# Patient Record
Sex: FEMALE | Race: BLACK OR AFRICAN AMERICAN | NOT HISPANIC OR LATINO | Employment: PART TIME | URBAN - METROPOLITAN AREA
[De-identification: names, ages, dates, MRNs, and addresses within clinical notes are randomized per-mention and may not be internally consistent; named-entity substitution may affect disease eponyms.]

---

## 2017-06-21 ENCOUNTER — HOSPITAL ENCOUNTER (EMERGENCY)
Facility: HOSPITAL | Age: 32
Discharge: HOME | End: 2017-06-21
Attending: EMERGENCY MEDICINE
Payer: MEDICAID

## 2017-06-21 VITALS
SYSTOLIC BLOOD PRESSURE: 184 MMHG | WEIGHT: 292 LBS | HEIGHT: 65 IN | OXYGEN SATURATION: 98 % | BODY MASS INDEX: 48.65 KG/M2 | DIASTOLIC BLOOD PRESSURE: 86 MMHG | RESPIRATION RATE: 20 BRPM | TEMPERATURE: 98 F | HEART RATE: 88 BPM

## 2017-06-21 DIAGNOSIS — R03.0 ELEVATED BLOOD PRESSURE READING: ICD-10-CM

## 2017-06-21 DIAGNOSIS — M25.562 ACUTE PAIN OF LEFT KNEE: Primary | ICD-10-CM

## 2017-06-21 LAB
B-HCG UR QL: NEGATIVE
CTP QC/QA: YES

## 2017-06-21 PROCEDURE — 99283 EMERGENCY DEPT VISIT LOW MDM: CPT | Mod: 25

## 2017-06-21 PROCEDURE — 81025 URINE PREGNANCY TEST: CPT | Performed by: EMERGENCY MEDICINE

## 2017-06-21 RX ORDER — NAPROXEN 500 MG/1
500 TABLET ORAL 2 TIMES DAILY WITH MEALS
Qty: 28 TABLET | Refills: 0 | Status: SHIPPED | OUTPATIENT
Start: 2017-06-21 | End: 2017-07-05

## 2017-06-21 NOTE — ED PROVIDER NOTES
"Encounter Date: 6/21/2017    SCRIBE #1 NOTE: I, Leslie Sierra, am scribing for, and in the presence of,  Regla Fonseca NP. I have scribed the following portions of the note - Other sections scribed: ROS and HPI.       History     Chief Complaint   Patient presents with    Leg Pain     "I was walking in the library yesterday and I tripped on something. I hurt my knee and leg (both left side)."    Knee Pain     CC: Knee Pain  HPI: This 31 y.o. female with a past medical history of Hypertension, presents to the ED complaining of a traumatic left knee pain s/p a mechanical slip and fall at a library yesterday. She states she fell forward on her L knee. Her knee pain is severe (8/10) and worse with walking. She denies any associated numbness or weakness to her L leg. She denies head trauma or LOC or any other injuries as a result of her fall. No prior medical intervention.       The history is provided by the patient. No  was used.     Review of patient's allergies indicates:  No Known Allergies  Past Medical History:   Diagnosis Date    Hypertension      Past Surgical History:   Procedure Laterality Date    HERNIA REPAIR      TUBAL LIGATION       History reviewed. No pertinent family history.  Social History   Substance Use Topics    Smoking status: Never Smoker    Smokeless tobacco: Not on file    Alcohol use Yes     Review of Systems   Constitutional: Negative for fever.   Musculoskeletal:        (+) L knee pain   Skin: Negative for rash and wound.   Neurological: Negative for syncope, weakness and numbness.       Physical Exam     Initial Vitals [06/21/17 0658]   BP Pulse Resp Temp SpO2   (!) 184/86 88 20 98 °F (36.7 °C) 98 %     Physical Exam    Constitutional: Vital signs are normal. She appears well-developed and well-nourished.  Non-toxic appearance.   Eyes: EOM are normal.   Neck: Full passive range of motion without pain. Neck supple. No no neck rigidity.   Cardiovascular: Normal " rate, S1 normal, S2 normal and normal heart sounds. Exam reveals no gallop.    No murmur heard.  Pulmonary/Chest: Effort normal and breath sounds normal. No tachypnea. She has no decreased breath sounds. She has no wheezes. She has no rhonchi. She has no rales.   Musculoskeletal:        Left knee: She exhibits normal range of motion, no swelling, no effusion, no ecchymosis, no deformity, no erythema, normal alignment and no bony tenderness. Tenderness found. Lateral joint line tenderness noted.   Gait is normal   Neurological: She is alert and oriented to person, place, and time. She has normal strength. Gait normal. GCS eye subscore is 4. GCS verbal subscore is 5. GCS motor subscore is 6.   Skin: Skin is warm and dry. No rash noted.         ED Course   Orthopedic Injury  Date/Time: 6/21/2017 9:57 AM  Authorized by: GO SARMIENTO   Performed by: ALKA URBAN  Injury location: knee  Location details: left knee  Injury type: soft tissue  Pre-procedure distal perfusion: normal  Pre-procedure neurological function: normal  Pre-procedure neurovascular assessment: neurovascularly intact  Pre-procedure range of motion: normal  Local anesthesia used: no    Anesthesia:  Local anesthesia used: no  Sedation:  Patient sedated: no    Splint type: Ace wrap.  Complications: No  Post-procedure neurovascular assessment: post-procedure neurovascularly intact  Post-procedure distal perfusion: normal  Post-procedure neurological function: normal  Post-procedure range of motion: normal  Patient tolerance: Patient tolerated the procedure well with no immediate complications        Labs Reviewed   POCT URINE PREGNANCY             Medical Decision Making:   ED Management:  This is a 31-year-old nonpregnant female who presents to the ED with complaints of left knee pain after a slip and fall that occurred yesterday.  She is afebrile and well-appearing.  Her gait is normal.  There is mild tenderness along the lateral aspect.  There  is no swelling, erythema or bony tenderness.  No indication for imaging today, the patient agrees with this.  Ace wrap given.  Advised follow-up with PCP for elevated blood pressure.  Discharged home with prescription for naproxen and instructions for supportive care and follow-up.  Return precautions given.  Patient's case was discussed with Dr. Hurtado, who agrees with her plan.            Scribe Attestation:   Scribe #1: I performed the above scribed service and the documentation accurately describes the services I performed. I attest to the accuracy of the note.    Attending Attestation:           Physician Attestation for Scribe:  Physician Attestation Statement for Scribe #1: I, Regla Fonseca NP, reviewed documentation, as scribed by Leslie Sierra in my presence, and it is both accurate and complete.                 ED Course     Clinical Impression:   The primary encounter diagnosis was Acute pain of left knee. A diagnosis of Elevated blood pressure reading was also pertinent to this visit.    Disposition:   Disposition: Discharged  Condition: Stable                        Regla Fonseca NP  06/21/17 0958

## 2017-06-21 NOTE — DISCHARGE INSTRUCTIONS
Please return to the ED for any new or worsening symptoms: chest pain, shortness of breath, loss of consciousness or any other concerns. Please follow up with primary care within in the week. You may also call [phone] for the [facility] same day appointment line.    Do not take any additional NSAIDS (ibuprofen, Aleve, etc) while taking naproxen.    Please see your primary care provider regarding high blood pressure.

## 2018-02-12 ENCOUNTER — HOSPITAL ENCOUNTER (EMERGENCY)
Facility: HOSPITAL | Age: 33
Discharge: HOME | End: 2018-02-13
Attending: EMERGENCY MEDICINE
Payer: MEDICAID

## 2018-02-12 DIAGNOSIS — R07.9 CHEST PAIN: ICD-10-CM

## 2018-02-12 DIAGNOSIS — G43.809 OTHER MIGRAINE WITHOUT STATUS MIGRAINOSUS, NOT INTRACTABLE: Primary | ICD-10-CM

## 2018-02-12 LAB
B-HCG UR QL: NEGATIVE
CTP QC/QA: YES

## 2018-02-12 PROCEDURE — 99284 EMERGENCY DEPT VISIT MOD MDM: CPT | Mod: 25

## 2018-02-12 PROCEDURE — 93010 ELECTROCARDIOGRAM REPORT: CPT | Mod: ,,, | Performed by: INTERNAL MEDICINE

## 2018-02-12 PROCEDURE — 81025 URINE PREGNANCY TEST: CPT | Performed by: EMERGENCY MEDICINE

## 2018-02-12 PROCEDURE — 93005 ELECTROCARDIOGRAM TRACING: CPT

## 2018-02-13 VITALS
SYSTOLIC BLOOD PRESSURE: 138 MMHG | OXYGEN SATURATION: 98 % | WEIGHT: 293 LBS | HEART RATE: 85 BPM | TEMPERATURE: 99 F | BODY MASS INDEX: 48.82 KG/M2 | DIASTOLIC BLOOD PRESSURE: 84 MMHG | HEIGHT: 65 IN | RESPIRATION RATE: 18 BRPM

## 2018-02-13 LAB
FLUAV AG SPEC QL IA: NEGATIVE
FLUBV AG SPEC QL IA: NEGATIVE
SPECIMEN SOURCE: NORMAL

## 2018-02-13 PROCEDURE — 87400 INFLUENZA A/B EACH AG IA: CPT | Mod: 59

## 2018-02-13 PROCEDURE — 96372 THER/PROPH/DIAG INJ SC/IM: CPT

## 2018-02-13 PROCEDURE — 63600175 PHARM REV CODE 636 W HCPCS: Performed by: EMERGENCY MEDICINE

## 2018-02-13 RX ORDER — PROCHLORPERAZINE MALEATE 5 MG
10 TABLET ORAL
Status: COMPLETED | OUTPATIENT
Start: 2018-02-13 | End: 2018-02-13

## 2018-02-13 RX ORDER — KETOROLAC TROMETHAMINE 30 MG/ML
15 INJECTION, SOLUTION INTRAMUSCULAR; INTRAVENOUS
Status: COMPLETED | OUTPATIENT
Start: 2018-02-13 | End: 2018-02-13

## 2018-02-13 RX ADMIN — KETOROLAC TROMETHAMINE 15 MG: 30 INJECTION, SOLUTION INTRAMUSCULAR at 12:02

## 2018-02-13 RX ADMIN — PROCHLORPERAZINE MALEATE 10 MG: 5 TABLET, FILM COATED ORAL at 12:02

## 2018-02-13 NOTE — DISCHARGE INSTRUCTIONS
You were seen in the emergency department for a left-sided headache.  Your exam was reassuring.  We gave you some headache medication in your symptoms improved.  We think you're safe to go home.  Please drink plenty of fluids.  Please follow-up with your primary care provider to discuss chronic headaches.  Please return for any new or worsening pain, fevers, chills, numbness, weakness, changes in vision, severe neck stiffness, or any other new or worsening concerns.

## 2018-02-13 NOTE — ED PROVIDER NOTES
Encounter Date: 2/12/2018    SCRIBE #1 NOTE: I, Art Rehman, am scribing for, and in the presence of,  Francisco Javier Ramon MD. I have scribed the following portions of the note - Other sections scribed: HPI, ROS and PE.       History     Chief Complaint   Patient presents with    Headache     started with HA on left side since earlier today with dizziness, sore throat; no vomiting but nauseated; no diarrhea; temp at home was 100.0; took eli seltzer earlier today and took nap but when woke up it was worse;      CC: Headache    HPI: This 32 y.o F with HTN presents to the ED c/o acute onset of a gradually worsening and severe (8/10) left side headache with associated nausea which began at 1330 while driving. The pt also reports left eye blurry vision. The pt's headache is similar to her previous headaches, but is more severe. The pt attempted tx with Eli seltzer and aleve at 2200 with slight improvement. The pt's headache is worse when laying on the left side. The pt also reports sore throat and bilateral ankle swelling. The pt denies emesis, neck pain, cough, rhinorrhea, chest pain, abdominal pain, back pain and neck stiffness.      The history is provided by the patient. No  was used.     Review of patient's allergies indicates:  No Known Allergies  Past Medical History:   Diagnosis Date    Hypertension      Past Surgical History:   Procedure Laterality Date    HERNIA REPAIR      TUBAL LIGATION       History reviewed. No pertinent family history.  Social History   Substance Use Topics    Smoking status: Never Smoker    Smokeless tobacco: Not on file    Alcohol use Yes     Review of Systems   Constitutional: Negative for chills, diaphoresis and fever.   HENT: Positive for sore throat. Negative for rhinorrhea.    Eyes: Positive for visual disturbance (L eye blurry vision). Negative for redness.   Respiratory: Negative for cough and shortness of breath.    Cardiovascular: Negative for chest  pain.   Gastrointestinal: Positive for nausea. Negative for abdominal pain, diarrhea and vomiting.   Genitourinary: Negative for difficulty urinating, dysuria, frequency and urgency.   Musculoskeletal: Negative for back pain, neck pain and neck stiffness.        (+) bilateral ankle swelling   Skin: Negative for rash.   Neurological: Positive for headaches (L side).   Psychiatric/Behavioral: Negative for confusion. The patient is not nervous/anxious.        Physical Exam     Initial Vitals [02/12/18 2317]   BP Pulse Resp Temp SpO2   (!) 144/90 87 20 99.5 °F (37.5 °C) 97 %      MAP       108         Physical Exam    Nursing note and vitals reviewed.  Constitutional: She appears well-developed and well-nourished. She is not diaphoretic.  Non-toxic appearance. She does not appear ill. No distress.   HENT:   Head: Normocephalic and atraumatic.   Nose: Nose normal.   Eyes: EOM are normal. Pupils are equal, round, and reactive to light. No scleral icterus.   Neck: Normal range of motion. Neck supple.   Cardiovascular: Normal rate, regular rhythm, normal heart sounds and intact distal pulses. Exam reveals no gallop and no friction rub.    No murmur heard.  Pulmonary/Chest: Effort normal and breath sounds normal. No stridor. No respiratory distress. She has no wheezes. She has no rhonchi. She has no rales.   Abdominal: Soft. Normal appearance and bowel sounds are normal. She exhibits no distension. There is no tenderness. There is no rebound and no guarding.   Musculoskeletal: Normal range of motion. She exhibits no edema or tenderness.   Neurological: She is alert and oriented to person, place, and time. No cranial nerve deficit.   Normal finger to nose, heel to shin, normal gait, normal speech and phonation, no pronator drift.     Skin: Skin is warm and dry. No rash noted.   Psychiatric: She has a normal mood and affect. Her behavior is normal.         ED Course   Procedures  Labs Reviewed   INFLUENZA A AND B ANTIGEN    POCT URINE PREGNANCY             Medical Decision Making:   Initial Assessment:   32-year-old female otherwise healthy with history of migraine headaches presenting with unilateral headache, left sided, gradual onset with mild nausea no vomiting.  Denies neck pain or stiffness.  States headache similar to prior headaches just slightly worse than usual. Denies trauma, fevers, chills. Notes recent URI with slight sore throat. Otherwise in usual state of health.  Denies numbness or weakness. Thorough neuro exam negative. Patient given toradol and compazine with symptomatic relief. Low concern for meningitis, no meningismus on exam, photophobia, fever. Well appearing patient. [place] subarachnoid hemorrhage rule negative for SAH. Patient feeling much better on toradol and compazine. Okay for DC home, strict return precautions and PCP f/u advised.             Scribe Attestation:   Scribe #1: I performed the above scribed service and the documentation accurately describes the services I performed. I attest to the accuracy of the note.    Attending Attestation:           Physician Attestation for Scribe:  Physician Attestation Statement for Scribe #1: I, Francisco Javier Ramon MD, reviewed documentation, as scribed by Art Rehman in my presence, and it is both accurate and complete.                    Clinical Impression:   The primary encounter diagnosis was Other migraine without status migrainosus, not intractable. A diagnosis of Chest pain was also pertinent to this visit.    Disposition:   Disposition: Discharged  Condition: Stable                        Francisco Javier Ramon MD  02/22/18 0118

## 2018-06-13 ENCOUNTER — HOSPITAL ENCOUNTER (EMERGENCY)
Facility: HOSPITAL | Age: 33
Discharge: HOME | End: 2018-06-13
Attending: EMERGENCY MEDICINE
Payer: MEDICAID

## 2018-06-13 VITALS
OXYGEN SATURATION: 100 % | BODY MASS INDEX: 48.82 KG/M2 | TEMPERATURE: 99 F | RESPIRATION RATE: 16 BRPM | HEART RATE: 76 BPM | WEIGHT: 293 LBS | DIASTOLIC BLOOD PRESSURE: 82 MMHG | HEIGHT: 65 IN | SYSTOLIC BLOOD PRESSURE: 164 MMHG

## 2018-06-13 DIAGNOSIS — M79.605 LEFT LEG PAIN: Primary | ICD-10-CM

## 2018-06-13 LAB
B-HCG UR QL: NEGATIVE
CTP QC/QA: YES

## 2018-06-13 PROCEDURE — 81025 URINE PREGNANCY TEST: CPT | Performed by: NURSE PRACTITIONER

## 2018-06-13 PROCEDURE — 63600175 PHARM REV CODE 636 W HCPCS: Performed by: NURSE PRACTITIONER

## 2018-06-13 PROCEDURE — 96372 THER/PROPH/DIAG INJ SC/IM: CPT

## 2018-06-13 PROCEDURE — 99284 EMERGENCY DEPT VISIT MOD MDM: CPT | Mod: 25

## 2018-06-13 RX ORDER — IBUPROFEN 600 MG/1
600 TABLET ORAL EVERY 6 HOURS PRN
Qty: 20 TABLET | Refills: 0 | OUTPATIENT
Start: 2018-06-13 | End: 2022-05-28

## 2018-06-13 RX ORDER — BETAMETHASONE SODIUM PHOSPHATE AND BETAMETHASONE ACETATE 3; 3 MG/ML; MG/ML
12 INJECTION, SUSPENSION INTRA-ARTICULAR; INTRALESIONAL; INTRAMUSCULAR; SOFT TISSUE
Status: COMPLETED | OUTPATIENT
Start: 2018-06-13 | End: 2018-06-13

## 2018-06-13 RX ADMIN — BETAMETHASONE SODIUM PHOSPHATE AND BETAMETHASONE ACETATE 12 MG: 3; 3 INJECTION, SUSPENSION INTRA-ARTICULAR; INTRALESIONAL; INTRAMUSCULAR at 10:06

## 2018-06-13 NOTE — ED TRIAGE NOTES
Patient presents to ED alert and oriented, via private transportation.patient reports left ankle pain x one month.

## 2018-06-13 NOTE — ED PROVIDER NOTES
"Encounter Date: 6/13/2018       History     Chief Complaint   Patient presents with    Ankle Pain     "Outside left ankle pain for a while and it goes up to right below the knee."  No medications for it      32-year-old obese female with no past medical history presenting for evaluation of left lower extremity pain has been ongoing for several months.  Pain is atraumatic.  She reports pain to the left calf, left thigh, and left hip.  She describes the pain as tingling and numb.  Pain is exacerbated by ambulation and weight-bearing.  She is unsure whether or not the pain radiates from her hip to her ankle.  She denies lumbar pain, saddle anesthesia, bowel/bladder incontinence, fever, weakness in the lower extremities, or any additional symptoms.  She has attempted treatment with Tylenol without relief of symptoms.          Review of patient's allergies indicates:  No Known Allergies  Past Medical History:   Diagnosis Date    Hypertension      Past Surgical History:   Procedure Laterality Date    HERNIA REPAIR      TUBAL LIGATION       History reviewed. No pertinent family history.  Social History   Substance Use Topics    Smoking status: Never Smoker    Smokeless tobacco: Not on file    Alcohol use Yes      Comment: ocassionally     Review of Systems   Constitutional: Negative for chills, fatigue and fever.   HENT: Negative for congestion, ear pain, nosebleeds, postnasal drip, rhinorrhea, sinus pressure and sore throat.    Eyes: Negative for photophobia and pain.   Respiratory: Negative for apnea, cough, choking, chest tightness, shortness of breath, wheezing and stridor.    Cardiovascular: Negative for chest pain, palpitations and leg swelling.   Gastrointestinal: Negative for abdominal pain, constipation, diarrhea, nausea and vomiting.   Genitourinary: Negative for dysuria, flank pain, hematuria, pelvic pain and vaginal discharge.   Musculoskeletal: Negative for arthralgias, back pain, gait problem and neck " pain.        Left lower extremity pain   Skin: Negative for color change, pallor, rash and wound.   Neurological: Negative for dizziness, seizures, syncope, facial asymmetry, light-headedness and headaches.   Hematological: Negative for adenopathy.   Psychiatric/Behavioral: Negative for confusion. The patient is not nervous/anxious.        Physical Exam     Initial Vitals [06/13/18 0658]   BP Pulse Resp Temp SpO2   (!) 144/90 85 16 99.2 °F (37.3 °C) 99 %      MAP       --         Physical Exam    Nursing note and vitals reviewed.  Constitutional: She appears well-developed and well-nourished. She is not diaphoretic. She is Obese . No distress.   HENT:   Head: Normocephalic and atraumatic.   Right Ear: External ear normal.   Left Ear: External ear normal.   Nose: Nose normal.   Eyes: Conjunctivae and EOM are normal. Pupils are equal, round, and reactive to light. Right eye exhibits no discharge. Left eye exhibits no discharge. No scleral icterus.   Neck: Normal range of motion. Neck supple. No thyromegaly present. No tracheal deviation present. No JVD present.   Cardiovascular: Normal rate, regular rhythm, normal heart sounds and intact distal pulses. Exam reveals no gallop and no friction rub.    No murmur heard.  Pulmonary/Chest: Breath sounds normal. No stridor. No respiratory distress. She has no wheezes. She has no rhonchi. She has no rales.   Abdominal: Soft. Bowel sounds are normal. She exhibits no distension and no mass. There is no tenderness. There is no rebound and no guarding.   Musculoskeletal: Normal range of motion. She exhibits no edema or tenderness.   Pain and mild tenderness to palpation to left thigh and left calf.  No swelling, erythema, bruising, warmth, or any other abnormalities.  DP pulses are equal and normal bilaterally. Capillary refill in the a left foot is normal. Compartments are soft.   Lymphadenopathy:     She has no cervical adenopathy.   Neurological: She is alert and oriented to  person, place, and time. She has normal strength and normal reflexes. She displays normal reflexes. No cranial nerve deficit or sensory deficit.   Skin: Skin is warm and dry. No rash and no abscess noted. No erythema. No pallor.   Psychiatric: She has a normal mood and affect. Her behavior is normal. Judgment and thought content normal.         ED Course   Procedures  Labs Reviewed   POCT URINE PREGNANCY          US Lower Extremity Veins Left   Final Result      No evidence of deep venous thrombosis in the left lower extremity.         Electronically signed by: Rachid Sneed MD   Date:    06/13/2018   Time:    09:27           Medical Decision Making:   Differential Diagnosis:   Includes DVT, lumbar radiculopathy, fracture, dislocation, compartment syndrome, others  Clinical Tests:   Radiological Study: Reviewed and Ordered  ED Management:  32-year-old female presenting for a traumatic left lower extremity pain that has been ongoing for several months.  Pain is exacerbated by weight-bearing and ambulation.  Pain is described as a tingling and a numbness.  The pain is the worst in the calf but also includes the thigh.  The patient cannot verbalize whether not the pain radiates from her hip to her foot.  She denies lumbar pain, abdominal pain, incontinence, nausea, vomiting, fever, or any additional symptoms. She is afebrile, well-appearing, and in no distress.  Vitals are stable. There is mild tenderness to the left calf and left thigh.  There is no swelling, erythema, warmth, bruising, or any additional abnormalities.  DP pulses are normal bilaterally.  Compartments are soft.  Ultrasound of the left lower extremity shows no acute abnormalities.  Specifically no evidence of DVT.  Uncertain etiology of patient's symptoms. Clinically symptoms are consistent with lumbar radiculopathy.  Treated with IM corticosteroids and prescribed ibuprofen at discharge. Advised patient to follow up with her PCP for re-evaluation and  further management.  ED return precautions given.  Patient expressed understanding of diagnosis, discharge instructions, return precautions.  Other:   I have discussed this case with another health care provider.       <> Summary of the Discussion: Case discussed with my attending physician who agreed with the assessment and plan.                      Clinical Impression:   The encounter diagnosis was Left leg pain.      Disposition:   Disposition: Discharged  Condition: Stable                        Michael Almazan NP  06/13/18 1012

## 2018-06-13 NOTE — DISCHARGE INSTRUCTIONS
Take ibuprofen as needed and only as prescribed for pain.    Follow-up with your primary physician for re-evaluation and further management.    Return to the emergency department for any new or worsening symptoms or as needed.

## 2020-10-19 ENCOUNTER — HOSPITAL ENCOUNTER (EMERGENCY)
Facility: HOSPITAL | Age: 35
Discharge: HOME | End: 2020-10-19
Attending: EMERGENCY MEDICINE
Payer: MEDICAID

## 2020-10-19 VITALS
OXYGEN SATURATION: 100 % | DIASTOLIC BLOOD PRESSURE: 85 MMHG | HEART RATE: 81 BPM | BODY MASS INDEX: 48.82 KG/M2 | HEIGHT: 65 IN | SYSTOLIC BLOOD PRESSURE: 146 MMHG | TEMPERATURE: 98 F | WEIGHT: 293 LBS | RESPIRATION RATE: 18 BRPM

## 2020-10-19 DIAGNOSIS — M54.42 ACUTE LEFT-SIDED LOW BACK PAIN WITH LEFT-SIDED SCIATICA: Primary | ICD-10-CM

## 2020-10-19 DIAGNOSIS — M54.12 CERVICAL RADICULOPATHY: ICD-10-CM

## 2020-10-19 LAB
B-HCG UR QL: NEGATIVE
CTP QC/QA: YES

## 2020-10-19 PROCEDURE — 96372 THER/PROPH/DIAG INJ SC/IM: CPT

## 2020-10-19 PROCEDURE — 63600175 PHARM REV CODE 636 W HCPCS: Performed by: EMERGENCY MEDICINE

## 2020-10-19 PROCEDURE — 99284 EMERGENCY DEPT VISIT MOD MDM: CPT | Mod: 25

## 2020-10-19 RX ORDER — KETOROLAC TROMETHAMINE 30 MG/ML
30 INJECTION, SOLUTION INTRAMUSCULAR; INTRAVENOUS
Status: COMPLETED | OUTPATIENT
Start: 2020-10-19 | End: 2020-10-19

## 2020-10-19 RX ORDER — DEXAMETHASONE SODIUM PHOSPHATE 4 MG/ML
4 INJECTION, SOLUTION INTRA-ARTICULAR; INTRALESIONAL; INTRAMUSCULAR; INTRAVENOUS; SOFT TISSUE
Status: COMPLETED | OUTPATIENT
Start: 2020-10-19 | End: 2020-10-19

## 2020-10-19 RX ORDER — LOSARTAN POTASSIUM 50 MG/1
50 TABLET ORAL DAILY
COMMUNITY

## 2020-10-19 RX ORDER — DIAZEPAM 5 MG/1
5 TABLET ORAL EVERY 8 HOURS PRN
Qty: 10 TABLET | Refills: 0 | Status: SHIPPED | OUTPATIENT
Start: 2020-10-19 | End: 2020-11-18

## 2020-10-19 RX ORDER — IBUPROFEN 600 MG/1
600 TABLET ORAL EVERY 8 HOURS PRN
Qty: 15 TABLET | Refills: 0 | OUTPATIENT
Start: 2020-10-19 | End: 2022-05-28

## 2020-10-19 RX ADMIN — DEXAMETHASONE SODIUM PHOSPHATE 4 MG: 4 INJECTION, SOLUTION INTRA-ARTICULAR; INTRALESIONAL; INTRAMUSCULAR; INTRAVENOUS; SOFT TISSUE at 08:10

## 2020-10-19 RX ADMIN — KETOROLAC TROMETHAMINE 30 MG: 30 INJECTION, SOLUTION INTRAMUSCULAR; INTRAVENOUS at 08:10

## 2020-10-19 NOTE — Clinical Note
"Anamaria"Malcolm Connell was seen and treated in our emergency department on 10/19/2020.  She may return to work on 10/21/2020.       If you have any questions or concerns, please don't hesitate to call.      Ethan Raman MD"

## 2020-10-20 NOTE — ED TRIAGE NOTES
Pt arrives to the ED reports left sided hip pain that radiates to her left leg and has some swelling. Reports hx of sciatic pain, pt states she took some OTC pain meds with no relief.

## 2020-10-20 NOTE — ED PROVIDER NOTES
Encounter Date: 10/19/2020       History     Chief Complaint   Patient presents with    Generalized Body Aches     Pt with hx of sciatica, began with pain about 0800 that has increased throughout the day. Pt c/o L leg pain into the hip.     Leg Swelling     Ms. Connell reports left-sided low back pain radiating to her left buttock, left hip down the lateral aspect of the left lower leg radiating around the left side of the leg to the top of the left foot, all has been present for at least 6 months.  She reports this is a flare of pain.  She reports home meloxicam is only minimally working.  She has been seen by PCP nurse practitioner and has been told she will need therapy but has not yet been referred for therapy.  She also reports similar type symptoms in the left side of the neck radiating down the left lateral upper arm for about 2-3 months, intermittently, and they do not always happen at the same time.  She denies any trauma.  She denies any weakness in any extremities.  She denies any changes or problems with her bladder or bowels.     The history is provided by the patient.     Review of patient's allergies indicates:  No Known Allergies  Past Medical History:   Diagnosis Date    Hypertension      Past Surgical History:   Procedure Laterality Date    HERNIA REPAIR      TUBAL LIGATION       History reviewed. No pertinent family history.  Social History     Tobacco Use    Smoking status: Never Smoker   Substance Use Topics    Alcohol use: Yes     Comment: ocassionally    Drug use: No     Review of Systems   Gastrointestinal: Negative.    Musculoskeletal:        No current neck or arm symptoms.   L lower back pain radiating to L hip and down lateral L leg. No weakness   All other systems reviewed and are negative.      Physical Exam     Initial Vitals [10/19/20 1705]   BP Pulse Resp Temp SpO2   (!) 179/89 84 18 98.9 °F (37.2 °C) 100 %      MAP       --         Physical Exam    Nursing note and vitals  reviewed.  Constitutional: She appears well-developed and well-nourished. She is not diaphoretic.   HENT:   Head: Normocephalic and atraumatic.   Eyes: Conjunctivae are normal.   Cardiovascular: Normal rate and regular rhythm.   No murmur heard.  Pulmonary/Chest: Breath sounds normal. No respiratory distress.   Musculoskeletal: Normal range of motion. No edema.      Comments: 1+ patellar reflexes, difficult due to habitus. Symmetric, good perfusion   Neurological: She is alert and oriented to person, place, and time. She has normal strength. GCS score is 15. GCS eye subscore is 4. GCS verbal subscore is 5. GCS motor subscore is 6.   Normal gait. Able to fully bend at waist and almost touch toes, positive straight leg raise at almost fully hip flexion. Full rom of neck. 5/5 strength in all ext         ED Course   Procedures  Labs Reviewed - No data to display       Imaging Results          X-Ray Cervical Spine AP And Lateral (Final result)  Result time 10/19/20 20:10:01    Final result by Rajeev Rocha MD (10/19/20 20:10:01)                 Impression:      1. No convincing acute displaced fracture or dislocation of the cervical spine as visualized.      Electronically signed by: Rajeev Rocha MD  Date:    10/19/2020  Time:    20:10             Narrative:    EXAMINATION:  XR CERVICAL SPINE AP LATERAL    CLINICAL HISTORY:  Radiculopathy, cervical region    TECHNIQUE:  AP, lateral and open mouth views of the cervical spine were performed.    COMPARISON:  None.    FINDINGS:  Please note, C7 is obscured by soft tissues on lateral view.    Allowing for the above, lateral imaging demonstrates adequate alignment of the cervical spine without significant vertebral body height loss or disc space height loss.  The facet joints are aligned.  AP spinal alignment is unremarkable.  The visualized lung apices are grossly clear.  There may be cardiomegaly versus magnification by technique.  The odontoid is intact.  The  lateral masses of C1 are in anatomic relationship with C2.  The paraspinous and hypopharyngeal soft tissues are unremarkable.                               X-Ray Lumbar Spine Ap And Lateral (Final result)  Result time 10/19/20 20:10:59    Final result by Rajeev Rocha MD (10/19/20 20:10:59)                 Impression:      1. No acute displaced fracture or dislocation of the lumbar spine.      Electronically signed by: Rajeev Rocha MD  Date:    10/19/2020  Time:    20:10             Narrative:    EXAMINATION:  XR LUMBAR SPINE AP AND LATERAL    CLINICAL HISTORY:  Back pain or radiculopathy, > 6 wks;    TECHNIQUE:  AP, lateral and spot images were performed of the lumbar spine.    COMPARISON:  None    FINDINGS:  Three views lumbar spine.    Lateral imaging demonstrates adequate alignment of the lumbar spine without significant vertebral body height loss or disc space height loss.  The facet joints are aligned.  AP spinal alignment is unremarkable noting patient is tilted to the left..  The sacral segments are aligned.  The bilateral sacroiliac joints are intact.                                 Medical Decision Making:   Clinical Tests:   Radiological Study: Ordered and Reviewed  ED Management:  Ms Connell has been stable during her time in the ER. Feels some better. Has a normal steady gait. Has had this pain for many months and has a f/u plan. No acute worsening or dangerous clinical concerns at this time.   We discussed home care and return precautions and she is happy with the plan. There is no indication for further emergent intervention or evaluation at this time.                                Clinical Impression:       ICD-10-CM ICD-9-CM   1. Acute left-sided low back pain with left-sided sciatica  M54.42 724.2     724.3   2. Cervical radiculopathy  M54.12 723.4                          ED Disposition Condition    Discharge Stable        ED Prescriptions     Medication Sig Dispense Start Date End Date  Auth. Provider    diazePAM (VALIUM) 5 MG tablet Take 1 tablet (5 mg total) by mouth every 8 (eight) hours as needed (muscle spasm). 10 tablet 10/19/2020 11/18/2020 Ethan Raman MD    ibuprofen (ADVIL,MOTRIN) 600 MG tablet Take 1 tablet (600 mg total) by mouth every 8 (eight) hours as needed for Pain. 15 tablet 10/19/2020  Ethan Raman MD        Follow-up Information     Follow up With Specialties Details Why Contact Info    [facility]  As needed, If symptoms worsen [address] [facility] [address] [phone]    Carla Ellis MD Orthopedic Surgery Schedule an appointment as soon as possible for a visit   [address] [facility] [address]  [phone]                                         Ethan Raman MD  10/20/20 7753

## 2020-11-12 ENCOUNTER — CLINICAL SUPPORT (OUTPATIENT)
Dept: REHABILITATION | Facility: HOSPITAL | Age: 35
End: 2020-11-12
Payer: MEDICAID

## 2020-11-12 DIAGNOSIS — M54.50 CHRONIC LEFT-SIDED LOW BACK PAIN, UNSPECIFIED WHETHER SCIATICA PRESENT: ICD-10-CM

## 2020-11-12 DIAGNOSIS — G89.29 CHRONIC LEFT-SIDED LOW BACK PAIN, UNSPECIFIED WHETHER SCIATICA PRESENT: ICD-10-CM

## 2020-11-12 DIAGNOSIS — R29.898 WEAKNESS OF LOWER EXTREMITY, UNSPECIFIED LATERALITY: ICD-10-CM

## 2020-11-12 PROCEDURE — 97162 PT EVAL MOD COMPLEX 30 MIN: CPT | Mod: PN

## 2020-11-12 NOTE — PROGRESS NOTES
[facility]  Physical Therapy Initial Evaluation    Date: 11/12/2020   Name: Anamaria Connell  Clinic Number: [id number]    Therapy Diagnosis:   Encounter Diagnoses   Name Primary?    Chronic left-sided low back pain, unspecified whether sciatica present     Weakness of lower extremity, unspecified laterality      Physician: Clementina Sharma NP    Physician Orders: PT Eval and Treat   Medical Diagnosis from Referral:M54.30 (ICD-10-CM) - Sciatic nerve pain     Evaluation Date: 11/12/2020  Authorization Period Expiration: 12/12/20  Plan of Care Expiration: 2/4/21  Visit # / Visits authorized: 1/ 1    Time In: 3:00 pm  Time Out: 3:38 pm  Total Appointment Time (timed & untimed codes): 38 minutes    Precautions: Standard and HTN    Subjective   Date of onset: August 2020  History of current condition - Anamaria reports: L sided LBP with L radicular symptoms x 1 year that have worsened over the last several months. No specific JOJO caused her pain. She also have been experiencing L ankle swelling. She states her pain is hard to describe but gets some numbness in her L ankle. Went to therapy in the past for her  Knee and ankle without improvement but has not attended for her back. She went to the ER for her back pain but has been unable to find an Orthopedic that takes her insurance. Denies bowel/bladder changes.     Medical History:   Past Medical History:   Diagnosis Date    Hypertension        Surgical History:   Anamaria Connell  has a past surgical history that includes Tubal ligation and Hernia repair.    Medications:   Anamaria has a current medication list which includes the following prescription(s): diazepam, ibuprofen, ibuprofen, lisinopril-hydrochlorothiazide, and losartan.    Allergies:   Review of patient's allergies indicates:  No Known Allergies     Imaging, xray lumbar spine  : Impression:     1. No acute displaced fracture or dislocation of the lumbar spine.      Prior Therapy:  none  Occupation: Nursing assistant  Prior Level of Function: independent, non painful walking  Current Level of Function:  ADL's and work activities limited by pain, unable to walk 1/2 mile due to pain    Pain:  Current 8/10, worst 10/10, best 4/10   Location: left lumbar spine  Description: Aching and Throbbing and numb  Aggravating Factors: bending, lifting, walking  Easing Factors: meloxicam and ib profen    Patients goals: reduce her pain    Objective       Observation: Ambulates without AD and non antalgic gait pattern.    Posture:  Decreased lumbar lordosis, B knee valgus, B femoral anteversion, B pes planus    Lumbar Range of Motion:    Degrees Pain   Flexion Touches toes, flattening of lumbar curve       Extension 12      Left Side Bending 20    Right Side Bending 20    Left rotation   50% Mild L sided LBP      Right Rotation   50%  Mild L sided LBP             Lower Extremity Strength  Right LE  Left LE    Knee extension: 4/5 Knee extension: 4/5   Knee flexion: 5/5 Knee flexion: 5/5   Hip flexion: 4/5 Hip flexion: 4/5   Hip extension:  3+/5 Hip extension: 3+/5   Hip abduction: 3+/5 Hip abduction: 3+/5   Ankle dorsiflexion: 5/5 Ankle dorsiflexion: 5/5         Special Tests:  -Repeated Flexion: negative  -Repeated Ext: negative  -SLR test: negative       DTR:   Right Left Comment   Patellar (L3-4) 2+ 2+        Joint Mobility: unable to assess secondary to soft tissue mass    Palpation: negative TTP but patient points to L quadratus lumborum as painful area    Sensation: SILT B LE    Flexibility: Mild decreased flexibility L hamstring        Limitation/Restriction for FOTO Lumbar Survey    Therapist reviewed FOTO scores for Anamaria Connell on 11/12/2020.   FOTO documents entered into [employer] - see Media section.    Limitation Score: 30%         TREATMENT   Treatment Time In: 3:30 pm  Treatment Time Out: 3:38 pm  Total Treatment time (time-based codes) separate from Evaluation: 8 minutes    Anamaria received  therapeutic exercises to develop strength and core stabilization for 8 minutes including:  LTR 2 x 10  PPT with TA brace 1 x 10  Bridges 1 x 10   Seated sciatic nerve glides on L 1 x 10       Anamaria received the following manual therapy techniques:  0 minutes, including:      Anamaria received hot pack for 0 minutes.    Anamaria received cold pack for 0 minutes.    Home Exercises and Patient Education Provided    Education provided:   - role of PT, plan of care, involved anatomy and pathology,  goals, rational for treatment and exercise, scheduling limitations      Written Home Exercises Provided: Patient instructed to cont prior HEP.  Exercises were reviewed and Anamaria was able to demonstrate them prior to the end of the session.  Anamaria demonstrated good  understanding of the education provided.     See EMR under Patient Instructions for exercises provided 11/12/2020.    Assessment   Anamaria is a 35 y.o. female referred to outpatient Physical Therapy with a medical diagnosis of M54.30 (ICD-10-CM) - Sciatic nerve pain. She reports chronic LBP x 1 year that has worsened over the last 3 months from no specific JOJO. Patient presents with mild decreased in lumbar AROM and B hip weakness. Demonstrates negative lumbar special tests, no directional preference, and negative TTP L lumbar spine. Unable to elicit her reports of L sided radicular symptoms during evaluation.Pt would benefit from skilled PT services in order to address listed deficits, improve tolerance to activities, establish HEP, and decrease pain to maximize quality of life. Pt is motivated to participate in therapy and is in agreement with POC.    Pt may be appropriate for healthy back program to improve back/hip strength and increase tolerance to work activities.     Patient prognosis is Good.   Patientt will benefit from skilled outpatient Physical Therapy to address the deficits stated above and in the chart below, provide patient /family education, and to  maximize patientt's level of independence.     Plan of care discussed with patient: Yes  Patient's spiritual, cultural and educational needs considered and patient is agreeable to the plan of care and goals as stated below:     Anticipated Barriers for therapy: chronicity of symptoms, high BMI    Medical Necessity is demonstrated by the following  History  Co-morbidities and personal factors that may impact the plan of care Co-morbidities:   HTN and chronicity of symptoms, high BMI    Personal Factors:   no deficits     moderate   Examination  Body Structures and Functions, activity limitations and participation restrictions that may impact the plan of care Body Regions:   back  lower extremities    Body Systems:    gross symmetry  ROM  strength  gross coordinated movement  balance  gait  transfers  transitions    Participation Restrictions:   Walking, working    Activity limitations:   Learning and applying knowledge  no deficits    General Tasks and Commands  no deficits    Communication  no deficits    Mobility  walking    Self care  no deficits    Domestic Life  doing house work (cleaning house, washing dishes, laundry)    Interactions/Relationships  no deficits    Life Areas  no deficits    Community and Social Life  community life  recreation and leisure         high   Clinical Presentation evolving clinical presentation with changing clinical characteristics moderate   Decision Making/ Complexity Score: moderate     Short Term Goals (4 Weeks):     1. Pt to increase strength of hip abduction and hip extension muscles to  >/= 4-/5  to allow for improvement in tolerance to lifting/bending during heavy household chores.  2. Pt to demonstrate understanding of pathology and use of HEP for improved self-management of symptoms.   3. Pt to report decreased pain to 7/10 current pain for increased participation in social/community activities.   4. Pt to increased FOTO performance to </= 25% limitation for improved  participation.   5. The patient will participate in healthy back evaluation.       Long Term Goals (8 Weeks):     1. Pt to increase strength of hip abduction and hip extension muscles to  >/= 4/5  to allow for improvement in tolerance to lifting/bending during heavy household chores.  2. Pt to demonstrate proficiency with HEP for improved independence with self-management of condition/symptoms and prevention of re-injury.   3. Pt to report decreased pain to </= 9/10 at worst for increased participation in social/community activities.   4. Pt to increased FOTO performance to </= 20 % limitation for improved participation.   5. The patient will report tolerating ambulation 1/2 mile with pain </= 5/10 to allow for return to walking for exercise.                 Plan   Plan of care Certification: 11/12/2020 to 2/4/21.    Outpatient Physical Therapy 2 times weekly for 8 weeks to include the following interventions: Manual Therapy, Moist Heat/ Ice, Neuromuscular Re-ed, Patient Education, Self Care, Therapeutic Activites and Therapeutic Exercise.     ILIA SMALLS, PT     I CERTIFY THE NEED FOR THESE SERVICES FURNISHED UNDER THIS PLAN OF TREATMENT AND WHILE UNDER MY CARE    Physician's comments:        Physician's Signature: ___________________________________________________

## 2020-11-13 PROBLEM — R29.898 LOWER EXTREMITY WEAKNESS: Status: ACTIVE | Noted: 2020-11-13

## 2020-11-13 PROBLEM — G89.29 CHRONIC BACK PAIN: Status: ACTIVE | Noted: 2020-11-13

## 2020-11-13 PROBLEM — M54.9 CHRONIC BACK PAIN: Status: ACTIVE | Noted: 2020-11-13

## 2020-12-01 ENCOUNTER — CLINICAL SUPPORT (OUTPATIENT)
Dept: REHABILITATION | Facility: HOSPITAL | Age: 35
End: 2020-12-01
Payer: MEDICAID

## 2020-12-01 DIAGNOSIS — R29.898 WEAKNESS OF LOWER EXTREMITY, UNSPECIFIED LATERALITY: ICD-10-CM

## 2020-12-01 DIAGNOSIS — M54.50 CHRONIC LEFT-SIDED LOW BACK PAIN, UNSPECIFIED WHETHER SCIATICA PRESENT: Primary | ICD-10-CM

## 2020-12-01 DIAGNOSIS — G89.29 CHRONIC LEFT-SIDED LOW BACK PAIN, UNSPECIFIED WHETHER SCIATICA PRESENT: Primary | ICD-10-CM

## 2020-12-01 PROCEDURE — 97110 THERAPEUTIC EXERCISES: CPT | Mod: PN

## 2020-12-01 NOTE — PROGRESS NOTES
[facility]      Name: Anamaria Connell  Clinic Number: [id number]    Therapy Diagnosis:   Encounter Diagnoses   Name Primary?    Chronic left-sided low back pain, unspecified whether sciatica present Yes    Weakness of lower extremity, unspecified laterality      Physician: Clementina Sharma NP    Visit Date: 2020    Physician Orders: PT Eval and Treat   Medical Diagnosis from Referral:M54.30 (ICD-10-CM) - Sciatic nerve pain      Evaluation Date: 2020  Authorization Period Expiration: 20  Plan of Care Expiration: 21  Visit # / Visits authorized:      Time In: 3:45 pm  Time Out: 4:40 pm  Total Appointment Time (timed & untimed codes): 55 minutes     Precautions: Standard and HTN    Pattern determined: 2    Subjective   Anamaria reports some slight improvements in symptoms since evaluation but they continue to fluctuate with time spent sitting at work..      Patient reports tolerating previous visit well with no residual issues or symptoms  Patient reports their pain to be 3/10 on a 0-10 scale with 0 being no pain and 10 being the worst pain imaginable.  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, walking  Pt goals: To be able to work without pain    Objective     Baseline IM Testing Results:   Date of testin2020  ROM 3-30 deg   Max Peak Torque 153    Min Peak Torque 51    Flex/Ext Ratio 3:1   % below normative data 45     Outcomes: FOTO  Initial score:  Visit 5 score:  Goal:      Treatment    Pt was instructed in and performed the following:     Anamaria received therapeutic exercises to develop/improved posture, cardiovascular endurance, muscular endurance, lumbar/cervical ROM, strength and muscular endurance for 50 minutes including the following exercises:     Initial lumbar MedX testing    [facility] 2020   Visit Number 1   VAS Pain Rating 3   Time 10   Flexion in Lying 10   Lumbar Extension Seat Pad 0   Femur  Restraint 6   Top Dead Center 24   Counterweight 219   Lumbar Flexion 30   Lumbar Extension 3   Lumbar Peak Torque 153   Min Torque 51   Test Percent Below Normative Data 45     Supine PPT  LTR x 2'  DKTC x 15      Peripheral muscle strengthening which included 1 set of 15-20 repetitions at a slow, controlled 10-13 second per rep pace focused on strengthening supporting musculature for improved body mechanics and functional mobility.  Pt and therapist focused on proper form during treatment to ensure optimal strengthening of each targeted muscle group.  Machines were utilized including torso rotation, leg extension, leg curl, chest press, upright row. Tricep extension, bicep curl, leg press, and hip abduction added visit 3    Anamaria received the following manual therapy techniques: nil.         Home Exercises Provided and Patient Education Provided   Home exercises include: none yet, next session  Cardio program: light walking program at home, short walks    Education provided:   - Initial medX testing plus pacing and technique for future exercise    Written Home Exercises Provided: Patient instructed to cont prior HEP.  Exercises were reviewed and Anamaria was able to demonstrate them prior to the end of the session.  Anamaria demonstrated good  understanding of the education provided.     See EMR under Patient Instructions for exercises provided prior visit.          Assessment   Patient came for first healthy back visit which was second overall visit. Anamaria tested out well below age norms for strength, especially at end range extension; patient was initially able to comfortably reach 0 degrees extension however machine will start at 3-30 degrees as patient's marked end-range weakness may make this position challenging when she begins exercising. Introduced core stability exercises with minimal issue but with several attempts at cueing prior to correct form and core engagement versus lumbar extension.    Patient is  making good progress towards established goals.  Pt will continue to benefit from skilled outpatient physical therapy to address the deficits stated in the impairment chart, provide pt/family education and to maximize pt's level of independence in the home and community environment.     Anticipated Barriers for therapy: Work schedule  Pt's spiritual, cultural and educational needs considered and pt agreeable to plan of care and goals as stated below:             Short Term Goals (4 Weeks):      1. Pt to increase strength of hip abduction and hip extension muscles to  >/= 4-/5  to allow for improvement in tolerance to lifting/bending during heavy household chores.  2. Pt to demonstrate understanding of pathology and use of HEP for improved self-management of symptoms.   3. Pt to report decreased pain to 7/10 current pain for increased participation in social/community activities.   4. Pt to increased FOTO performance to </= 25% limitation for improved participation.   5. Patient will improve average strength index by 50% in order to improve ability to participate in work activities without pain.        Long Term Goals (8 Weeks):      1. Pt to increase strength of hip abduction and hip extension muscles to  >/= 4/5  to allow for improvement in tolerance to lifting/bending during heavy household chores.  2. Pt to demonstrate proficiency with HEP for improved independence with self-management of condition/symptoms and prevention of re-injury.   3. Pt to report decreased pain to </= 9/10 at worst for increased participation in social/community activities.   4. Pt to increased FOTO performance to </= 20 % limitation for improved participation.   5. The patient will report tolerating ambulation 1/2 mile with pain </= 5/10 to allow for return to walking for exercise.   6. 5. Patient will improve average strength index by 75% in order to improve ability to participate in work activities without pain.         Plan   Continue  with established Plan of Care towards established PT goals.     Griffin Houser PT, DPT

## 2020-12-28 ENCOUNTER — CLINICAL SUPPORT (OUTPATIENT)
Dept: REHABILITATION | Facility: HOSPITAL | Age: 35
End: 2020-12-28
Payer: MEDICAID

## 2020-12-28 DIAGNOSIS — G89.29 CHRONIC LEFT-SIDED LOW BACK PAIN, UNSPECIFIED WHETHER SCIATICA PRESENT: ICD-10-CM

## 2020-12-28 DIAGNOSIS — R29.898 WEAKNESS OF LOWER EXTREMITY, UNSPECIFIED LATERALITY: ICD-10-CM

## 2020-12-28 DIAGNOSIS — M54.50 CHRONIC LEFT-SIDED LOW BACK PAIN, UNSPECIFIED WHETHER SCIATICA PRESENT: ICD-10-CM

## 2020-12-28 PROCEDURE — 97110 THERAPEUTIC EXERCISES: CPT | Mod: PN

## 2020-12-28 NOTE — PROGRESS NOTES
"[facility]      Name: Anamaria Connell  Clinic Number: [id number]    Therapy Diagnosis:   Encounter Diagnoses   Name Primary?    Chronic left-sided low back pain, unspecified whether sciatica present     Weakness of lower extremity, unspecified laterality      Physician: Clementina Sharma NP    Visit Date: 2020    Physician Orders: PT Eval and Treat   Medical Diagnosis from Referral:M54.30 (ICD-10-CM) - Sciatic nerve pain      Evaluation Date: 2020  Authorization Period Expiration: 20  Plan of Care Expiration: 21  Visit # / Visits authorized: 3/12     Time In: 1030  Time Out: 1125  Total Appointment Time: 55 minutes     Precautions: Standard and HTN    Pattern determined: 2    Subjective   Anamaria reports some slight improvements in symptoms but they continue to fluctuate with time spent sitting at work..      Patient reports tolerating previous visit well with no residual issues or symptoms  Patient reports their pain to be 3/10 on a 0-10 scale with 0 being no pain and 10 being the worst pain imaginable.  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, walking  Pt goals: To be able to work without pain    Objective     Baseline IM Testing Results:   Date of testin2020  ROM 3-30 deg   Max Peak Torque 153    Min Peak Torque 51    Flex/Ext Ratio 3:1   % below normative data 45     Outcomes: FOTO  Initial score:  Visit 5 score:  Goal:      Treatment    Pt was instructed in and performed the following:     Anamaria received therapeutic exercises to develop/improved posture, cardiovascular endurance, muscular endurance, lumbar/cervical ROM, strength and muscular endurance for 50 minutes including the following exercises:     Supine PPT 3x5  Supine PPT + bent knee fallout 2x5  LTR x 20  DKTC x 20  Supine piriformis stretch 3x10"     [facility] 2020   Visit Number 2   VAS Pain Rating 3   Time 10   Flexion in Lying 10 "   Lumbar Extension - Seat Pad -   Femur Restraint -   Top Dead Center -   Counterweight -   Lumbar Flexion -   Lumbar Extension -   Lumbar Peak Torque -   Lumbar Weight 60   Repetitions 15   Rating of Perceived Exertion 4       Peripheral muscle strengthening which included 1 set of 15-20 repetitions at a slow, controlled 10-13 second per rep pace focused on strengthening supporting musculature for improved body mechanics and functional mobility.  Pt and therapist focused on proper form during treatment to ensure optimal strengthening of each targeted muscle group.  Machines were utilized including torso rotation, leg extension, leg curl, chest press, upright row. Tricep extension, bicep curl, leg press, and hip abduction added visit 3    Anamaria received the following manual therapy techniques: nil.         Home Exercises Provided and Patient Education Provided   Home exercises include: none yet, next session  Cardio program: light walking program at home, short walks    Education provided:   - Benefit of regular stretching at home as well as practicing lower abdominal recruitment via PPT    Written Home Exercises Provided: Patient instructed to cont prior HEP.  Exercises were reviewed and Anamaria was able to demonstrate them prior to the end of the session.  Anamaria demonstrated good  understanding of the education provided.     See EMR under Patient Instructions for exercises provided prior visit.          Assessment   Patient came for first healthy back followup visit with continued presence of symptoms decreased from first visit. Needed significant cueing again initially for core engagement vs lumbar extension, was eventually able to perform PPT for a few reps at a time with sporadic reminders on form.  Anamaria initiated lumbar MedX strengthening at 60 ft/lb of resistance which was below 50% max value due to flex/ext ration, and she was able to complete 15 repetitions with a final reported RPE of 4-5/10. Will  "maintain these settings until difficulty decreases; also will continue education on proper PPT for progression to additional "PPT plus" exercises.     Patient is making good progress towards established goals.  Pt will continue to benefit from skilled outpatient physical therapy to address the deficits stated in the impairment chart, provide pt/family education and to maximize pt's level of independence in the home and community environment.     Anticipated Barriers for therapy: Work schedule  Pt's spiritual, cultural and educational needs considered and pt agreeable to plan of care and goals as stated below:             Short Term Goals (4 Weeks):      1. Pt to increase strength of hip abduction and hip extension muscles to  >/= 4-/5  to allow for improvement in tolerance to lifting/bending during heavy household chores.  2. Pt to demonstrate understanding of pathology and use of HEP for improved self-management of symptoms.   3. Pt to report decreased pain to 7/10 current pain for increased participation in social/community activities.   4. Pt to increased FOTO performance to </= 25% limitation for improved participation.   5. Patient will improve average strength index by 50% in order to improve ability to participate in work activities without pain.        Long Term Goals (8 Weeks):      1. Pt to increase strength of hip abduction and hip extension muscles to  >/= 4/5  to allow for improvement in tolerance to lifting/bending during heavy household chores.  2. Pt to demonstrate proficiency with HEP for improved independence with self-management of condition/symptoms and prevention of re-injury.   3. Pt to report decreased pain to </= 9/10 at worst for increased participation in social/community activities.   4. Pt to increased FOTO performance to </= 20 % limitation for improved participation.   5. The patient will report tolerating ambulation 1/2 mile with pain </= 5/10 to allow for return to walking for " exercise.   6. 5. Patient will improve average strength index by 75% in order to improve ability to participate in work activities without pain.         Plan   Continue with established Plan of Care towards established PT goals.     Griffin Houser PT, DPT

## 2020-12-30 ENCOUNTER — CLINICAL SUPPORT (OUTPATIENT)
Dept: REHABILITATION | Facility: HOSPITAL | Age: 35
End: 2020-12-30
Payer: MEDICAID

## 2020-12-30 DIAGNOSIS — G89.29 CHRONIC LEFT-SIDED LOW BACK PAIN, UNSPECIFIED WHETHER SCIATICA PRESENT: ICD-10-CM

## 2020-12-30 DIAGNOSIS — M54.50 CHRONIC LEFT-SIDED LOW BACK PAIN, UNSPECIFIED WHETHER SCIATICA PRESENT: ICD-10-CM

## 2020-12-30 DIAGNOSIS — R29.898 WEAKNESS OF LOWER EXTREMITY, UNSPECIFIED LATERALITY: ICD-10-CM

## 2020-12-30 PROCEDURE — 97110 THERAPEUTIC EXERCISES: CPT | Mod: PN

## 2020-12-30 NOTE — PROGRESS NOTES
[facility]      Name: Anamaria Connell  Clinic Number: [id number]    Therapy Diagnosis:   Encounter Diagnoses   Name Primary?    Chronic left-sided low back pain, unspecified whether sciatica present     Weakness of lower extremity, unspecified laterality      Physician: Clementina Sharma NP    Visit Date: 2020    Physician Orders: PT Eval and Treat   Medical Diagnosis from Referral:M54.30 (ICD-10-CM) - Sciatic nerve pain      Evaluation Date: 2020  Authorization Period Expiration: 20  Plan of Care Expiration: 21  Visit # / Visits authorized:      Time In: 1030  Time Out: 1125  Total Appointment Time: 55 minutes     Precautions: Standard and HTN    Pattern determined: 2    Subjective   Anamaria reports some slight improvements in symptoms but they continue to fluctuate with time spent sitting at work..      Patient reports tolerating previous visit well with no residual issues or symptoms  Patient reports their pain to be 3/10 on a 0-10 scale with 0 being no pain and 10 being the worst pain imaginable.  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, walking  Pt goals: To be able to work without pain    Objective     Baseline IM Testing Results:   Date of testin2020  ROM 3-30 deg   Max Peak Torque 153    Min Peak Torque 51    Flex/Ext Ratio 3:1   % below normative data 45     Outcomes: FOTO  Initial score:  Visit 5 score:  Goal:      Treatment    Pt was instructed in and performed the following:     Anamaria received therapeutic exercises to develop/improved posture, cardiovascular endurance, muscular endurance, lumbar/cervical ROM, strength and muscular endurance for 50 minutes including the following exercises:     Partial wall squats with ball 3x5  Standing PPT x 15  Supine low abd activation  Seated trunk flexion 3-way with ball x 20    [facility] - Short 2020   Visit Number 3   VAS Pain Rating 3   Time 10   Flexion in  Lying 10   Flexion in Sitting 10   Lumbar Extension - Seat Pad -   Femur Restraint -   Top Dead Center -   Counterweight -   Lumbar Flexion -   Lumbar Extension -   Lumbar Peak Torque -   Lumbar Weight 60   Repetitions 20   Rating of Perceived Exertion 4         Peripheral muscle strengthening which included 1 set of 15-20 repetitions at a slow, controlled 10-13 second per rep pace focused on strengthening supporting musculature for improved body mechanics and functional mobility.  Pt and therapist focused on proper form during treatment to ensure optimal strengthening of each targeted muscle group.  Machines were utilized including torso rotation, leg extension, leg curl, chest press, upright row. Tricep extension, bicep curl, leg press, and hip abduction added visit 3    Anamaria received the following manual therapy techniques: nil.         Home Exercises Provided and Patient Education Provided   Home exercises include: none yet, next session  Cardio program: light walking program at home, short walks    Education provided:   - Benefit of regular stretching at home as well as practicing lower abdominal recruitment via PPT    Written Home Exercises Provided: Patient instructed to cont prior HEP.  Exercises were reviewed and Anamaria was able to demonstrate them prior to the end of the session.  Anamaria demonstrated good  understanding of the education provided.     See EMR under Patient Instructions for exercises provided prior visit.          Assessment   Anamaria came for healthy back followup visit with minimal change in symptoms; continues to have severe difficulty with performing correct low abdominal recruitment vs lumbar extension. She was able to correctly perform engagement via abdominal crunch but was unable to perform PPT for consecutive repetitions. Will continue to educate regarding this, and again encouraged her that she needs to practice this at home in order for carryover to begin happening. Anamaria  continued lumbar MedX strengthening at 60 ft/lb of resistance and she was able to complete 20 repetitions with a final reported RPE of 4/10. Will consider increase in resistance next therapy session.     Patient is making good progress towards established goals.  Pt will continue to benefit from skilled outpatient physical therapy to address the deficits stated in the impairment chart, provide pt/family education and to maximize pt's level of independence in the home and community environment.     Anticipated Barriers for therapy: Work schedule  Pt's spiritual, cultural and educational needs considered and pt agreeable to plan of care and goals as stated below:             Short Term Goals (4 Weeks):      1. Pt to increase strength of hip abduction and hip extension muscles to  >/= 4-/5  to allow for improvement in tolerance to lifting/bending during heavy household chores.  2. Pt to demonstrate understanding of pathology and use of HEP for improved self-management of symptoms.   3. Pt to report decreased pain to 7/10 current pain for increased participation in social/community activities.   4. Pt to increased FOTO performance to </= 25% limitation for improved participation.   5. Patient will improve average strength index by 50% in order to improve ability to participate in work activities without pain.        Long Term Goals (8 Weeks):      1. Pt to increase strength of hip abduction and hip extension muscles to  >/= 4/5  to allow for improvement in tolerance to lifting/bending during heavy household chores.  2. Pt to demonstrate proficiency with HEP for improved independence with self-management of condition/symptoms and prevention of re-injury.   3. Pt to report decreased pain to </= 9/10 at worst for increased participation in social/community activities.   4. Pt to increased FOTO performance to </= 20 % limitation for improved participation.   5. The patient will report tolerating ambulation 1/2 mile with  pain </= 5/10 to allow for return to walking for exercise.   6. 5. Patient will improve average strength index by 75% in order to improve ability to participate in work activities without pain.         Plan   Continue with established Plan of Care towards established PT goals.     Griffin Houser PT, DPT

## 2021-01-06 ENCOUNTER — TELEPHONE (OUTPATIENT)
Dept: REHABILITATION | Facility: HOSPITAL | Age: 36
End: 2021-01-06

## 2021-01-07 ENCOUNTER — CLINICAL SUPPORT (OUTPATIENT)
Dept: REHABILITATION | Facility: HOSPITAL | Age: 36
End: 2021-01-07
Payer: MEDICAID

## 2021-01-07 DIAGNOSIS — G89.29 CHRONIC LEFT-SIDED LOW BACK PAIN, UNSPECIFIED WHETHER SCIATICA PRESENT: ICD-10-CM

## 2021-01-07 DIAGNOSIS — R29.898 WEAKNESS OF LOWER EXTREMITY, UNSPECIFIED LATERALITY: ICD-10-CM

## 2021-01-07 DIAGNOSIS — M54.50 CHRONIC LEFT-SIDED LOW BACK PAIN, UNSPECIFIED WHETHER SCIATICA PRESENT: ICD-10-CM

## 2021-01-07 PROCEDURE — 97110 THERAPEUTIC EXERCISES: CPT | Mod: PN

## 2021-01-11 ENCOUNTER — CLINICAL SUPPORT (OUTPATIENT)
Dept: REHABILITATION | Facility: HOSPITAL | Age: 36
End: 2021-01-11
Payer: MEDICAID

## 2021-01-11 DIAGNOSIS — R29.898 WEAKNESS OF LOWER EXTREMITY, UNSPECIFIED LATERALITY: ICD-10-CM

## 2021-01-11 DIAGNOSIS — G89.29 CHRONIC LEFT-SIDED LOW BACK PAIN, UNSPECIFIED WHETHER SCIATICA PRESENT: ICD-10-CM

## 2021-01-11 DIAGNOSIS — M54.50 CHRONIC LEFT-SIDED LOW BACK PAIN, UNSPECIFIED WHETHER SCIATICA PRESENT: ICD-10-CM

## 2021-01-11 PROCEDURE — 97110 THERAPEUTIC EXERCISES: CPT | Mod: PN

## 2021-01-25 ENCOUNTER — CLINICAL SUPPORT (OUTPATIENT)
Dept: REHABILITATION | Facility: HOSPITAL | Age: 36
End: 2021-01-25
Payer: MEDICAID

## 2021-01-25 DIAGNOSIS — R29.898 WEAKNESS OF LEFT LOWER EXTREMITY: ICD-10-CM

## 2021-01-25 DIAGNOSIS — G89.29 CHRONIC LEFT-SIDED LOW BACK PAIN, UNSPECIFIED WHETHER SCIATICA PRESENT: ICD-10-CM

## 2021-01-25 DIAGNOSIS — M54.50 CHRONIC LEFT-SIDED LOW BACK PAIN, UNSPECIFIED WHETHER SCIATICA PRESENT: ICD-10-CM

## 2021-01-25 PROCEDURE — 97110 THERAPEUTIC EXERCISES: CPT | Mod: PN,CQ

## 2021-01-27 ENCOUNTER — CLINICAL SUPPORT (OUTPATIENT)
Dept: REHABILITATION | Facility: HOSPITAL | Age: 36
End: 2021-01-27
Payer: MEDICAID

## 2021-01-27 DIAGNOSIS — M54.50 CHRONIC LEFT-SIDED LOW BACK PAIN, UNSPECIFIED WHETHER SCIATICA PRESENT: ICD-10-CM

## 2021-01-27 DIAGNOSIS — G89.29 CHRONIC LEFT-SIDED LOW BACK PAIN, UNSPECIFIED WHETHER SCIATICA PRESENT: ICD-10-CM

## 2021-01-27 DIAGNOSIS — R29.898 WEAKNESS OF LEFT LOWER EXTREMITY: ICD-10-CM

## 2021-01-27 PROCEDURE — 97110 THERAPEUTIC EXERCISES: CPT | Mod: PN,CQ

## 2021-02-02 ENCOUNTER — CLINICAL SUPPORT (OUTPATIENT)
Dept: REHABILITATION | Facility: HOSPITAL | Age: 36
End: 2021-02-02
Payer: MEDICAID

## 2021-02-02 DIAGNOSIS — G89.29 CHRONIC LEFT-SIDED LOW BACK PAIN, UNSPECIFIED WHETHER SCIATICA PRESENT: ICD-10-CM

## 2021-02-02 DIAGNOSIS — R29.898 WEAKNESS OF LEFT LOWER EXTREMITY: ICD-10-CM

## 2021-02-02 DIAGNOSIS — M54.50 CHRONIC LEFT-SIDED LOW BACK PAIN, UNSPECIFIED WHETHER SCIATICA PRESENT: ICD-10-CM

## 2021-02-02 PROCEDURE — 97110 THERAPEUTIC EXERCISES: CPT | Mod: PN,CQ

## 2021-02-08 ENCOUNTER — CLINICAL SUPPORT (OUTPATIENT)
Dept: REHABILITATION | Facility: HOSPITAL | Age: 36
End: 2021-02-08
Payer: MEDICAID

## 2021-02-08 DIAGNOSIS — G89.29 CHRONIC LEFT-SIDED LOW BACK PAIN, UNSPECIFIED WHETHER SCIATICA PRESENT: Primary | ICD-10-CM

## 2021-02-08 DIAGNOSIS — R29.898 WEAKNESS OF LEFT LOWER EXTREMITY: ICD-10-CM

## 2021-02-08 DIAGNOSIS — M54.50 CHRONIC LEFT-SIDED LOW BACK PAIN, UNSPECIFIED WHETHER SCIATICA PRESENT: Primary | ICD-10-CM

## 2021-02-08 PROCEDURE — 97110 THERAPEUTIC EXERCISES: CPT | Mod: PN

## 2021-02-15 ENCOUNTER — CLINICAL SUPPORT (OUTPATIENT)
Dept: REHABILITATION | Facility: HOSPITAL | Age: 36
End: 2021-02-15
Payer: MEDICAID

## 2021-02-15 DIAGNOSIS — M54.50 CHRONIC LEFT-SIDED LOW BACK PAIN, UNSPECIFIED WHETHER SCIATICA PRESENT: ICD-10-CM

## 2021-02-15 DIAGNOSIS — R29.898 WEAKNESS OF LEFT LOWER EXTREMITY: ICD-10-CM

## 2021-02-15 DIAGNOSIS — G89.29 CHRONIC LEFT-SIDED LOW BACK PAIN, UNSPECIFIED WHETHER SCIATICA PRESENT: ICD-10-CM

## 2021-02-15 PROCEDURE — 97110 THERAPEUTIC EXERCISES: CPT | Mod: PN

## 2021-02-25 ENCOUNTER — CLINICAL SUPPORT (OUTPATIENT)
Dept: REHABILITATION | Facility: HOSPITAL | Age: 36
End: 2021-02-25
Payer: MEDICAID

## 2021-02-25 DIAGNOSIS — G89.29 CHRONIC LEFT-SIDED LOW BACK PAIN, UNSPECIFIED WHETHER SCIATICA PRESENT: ICD-10-CM

## 2021-02-25 DIAGNOSIS — R29.898 WEAKNESS OF LEFT LOWER EXTREMITY: ICD-10-CM

## 2021-02-25 DIAGNOSIS — M54.50 CHRONIC LEFT-SIDED LOW BACK PAIN, UNSPECIFIED WHETHER SCIATICA PRESENT: ICD-10-CM

## 2021-02-25 PROCEDURE — 97110 THERAPEUTIC EXERCISES: CPT | Mod: PN

## 2021-03-08 ENCOUNTER — CLINICAL SUPPORT (OUTPATIENT)
Dept: REHABILITATION | Facility: HOSPITAL | Age: 36
End: 2021-03-08
Payer: MEDICAID

## 2021-03-08 DIAGNOSIS — R29.898 WEAKNESS OF LEFT LOWER EXTREMITY: ICD-10-CM

## 2021-03-08 DIAGNOSIS — M54.50 CHRONIC LEFT-SIDED LOW BACK PAIN, UNSPECIFIED WHETHER SCIATICA PRESENT: ICD-10-CM

## 2021-03-08 DIAGNOSIS — G89.29 CHRONIC LEFT-SIDED LOW BACK PAIN, UNSPECIFIED WHETHER SCIATICA PRESENT: ICD-10-CM

## 2021-03-08 PROCEDURE — 97110 THERAPEUTIC EXERCISES: CPT | Mod: PN

## 2021-03-22 ENCOUNTER — CLINICAL SUPPORT (OUTPATIENT)
Dept: REHABILITATION | Facility: HOSPITAL | Age: 36
End: 2021-03-22
Payer: MEDICAID

## 2021-03-22 DIAGNOSIS — R29.898 WEAKNESS OF LEFT LOWER EXTREMITY: ICD-10-CM

## 2021-03-22 DIAGNOSIS — G89.29 CHRONIC LEFT-SIDED LOW BACK PAIN, UNSPECIFIED WHETHER SCIATICA PRESENT: ICD-10-CM

## 2021-03-22 DIAGNOSIS — M54.50 CHRONIC LEFT-SIDED LOW BACK PAIN, UNSPECIFIED WHETHER SCIATICA PRESENT: ICD-10-CM

## 2021-03-22 PROCEDURE — 97110 THERAPEUTIC EXERCISES: CPT | Mod: PN

## 2021-04-19 ENCOUNTER — HOSPITAL ENCOUNTER (EMERGENCY)
Facility: HOSPITAL | Age: 36
Discharge: HOME | End: 2021-04-19
Attending: EMERGENCY MEDICINE
Payer: MEDICAID

## 2021-04-19 VITALS
SYSTOLIC BLOOD PRESSURE: 143 MMHG | HEIGHT: 65 IN | OXYGEN SATURATION: 99 % | RESPIRATION RATE: 18 BRPM | TEMPERATURE: 98 F | HEART RATE: 84 BPM | BODY MASS INDEX: 48.82 KG/M2 | WEIGHT: 293 LBS | DIASTOLIC BLOOD PRESSURE: 92 MMHG

## 2021-04-19 DIAGNOSIS — M25.562 LEFT KNEE PAIN: Primary | ICD-10-CM

## 2021-04-19 PROCEDURE — 99283 EMERGENCY DEPT VISIT LOW MDM: CPT | Mod: 25

## 2021-04-19 RX ORDER — AMLODIPINE BESYLATE 5 MG/1
5 TABLET ORAL DAILY
COMMUNITY
Start: 2021-04-06

## 2021-04-19 RX ORDER — DICLOFENAC SODIUM 50 MG/1
100 TABLET, DELAYED RELEASE ORAL DAILY
COMMUNITY
Start: 2021-04-06 | End: 2023-11-05

## 2021-04-19 RX ORDER — AMITRIPTYLINE HYDROCHLORIDE 25 MG/1
25 TABLET, FILM COATED ORAL DAILY
COMMUNITY
Start: 2021-03-30

## 2021-04-19 RX ORDER — CYCLOBENZAPRINE HCL 5 MG
TABLET ORAL
COMMUNITY
Start: 2021-01-25 | End: 2022-05-28

## 2021-04-19 RX ORDER — LOSARTAN POTASSIUM AND HYDROCHLOROTHIAZIDE 25; 100 MG/1; MG/1
1 TABLET ORAL DAILY
COMMUNITY
Start: 2021-01-25

## 2021-04-26 ENCOUNTER — PATIENT MESSAGE (OUTPATIENT)
Dept: RESEARCH | Facility: HOSPITAL | Age: 36
End: 2021-04-26

## 2021-06-06 ENCOUNTER — HOSPITAL ENCOUNTER (EMERGENCY)
Facility: HOSPITAL | Age: 36
Discharge: HOME | End: 2021-06-06
Attending: EMERGENCY MEDICINE
Payer: MEDICAID

## 2021-06-06 VITALS
HEART RATE: 102 BPM | SYSTOLIC BLOOD PRESSURE: 163 MMHG | RESPIRATION RATE: 18 BRPM | WEIGHT: 293 LBS | BODY MASS INDEX: 48.82 KG/M2 | HEIGHT: 65 IN | DIASTOLIC BLOOD PRESSURE: 85 MMHG | TEMPERATURE: 99 F | OXYGEN SATURATION: 98 %

## 2021-06-06 DIAGNOSIS — S39.012A STRAIN OF LUMBAR REGION, INITIAL ENCOUNTER: ICD-10-CM
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
## 2021-06-06 DIAGNOSIS — M25.569 KNEE PAIN: ICD-10-CM

## 2021-06-06 DIAGNOSIS — M25.559 HIP PAIN: ICD-10-CM

## 2021-06-06 LAB
B-HCG UR QL: NEGATIVE
CTP QC/QA: YES

## 2021-06-06 PROCEDURE — 81025 URINE PREGNANCY TEST: CPT | Performed by: PHYSICIAN ASSISTANT

## 2021-06-06 PROCEDURE — 99284 EMERGENCY DEPT VISIT MOD MDM: CPT | Mod: 25

## 2021-06-06 RX ORDER — NAPROXEN 500 MG/1
500 TABLET ORAL 2 TIMES DAILY PRN
Qty: 30 TABLET | Refills: 0 | Status: SHIPPED | OUTPATIENT
Start: 2021-06-06 | End: 2022-05-28 | Stop reason: HOSPADM

## 2021-06-06 RX ORDER — METHOCARBAMOL 500 MG/1
500 TABLET, FILM COATED ORAL 3 TIMES DAILY
Qty: 15 TABLET | Refills: 0 | Status: SHIPPED | OUTPATIENT
Start: 2021-06-06 | End: 2021-06-11

## 2021-08-03 ENCOUNTER — DOCUMENTATION ONLY (OUTPATIENT)
Dept: REHABILITATION | Facility: HOSPITAL | Age: 36
End: 2021-08-03

## 2022-04-21 DIAGNOSIS — M17.12 DEGENERATIVE ARTHRITIS OF LEFT KNEE: Primary | ICD-10-CM

## 2022-05-28 ENCOUNTER — HOSPITAL ENCOUNTER (EMERGENCY)
Facility: HOSPITAL | Age: 37
Discharge: HOME | End: 2022-05-28
Attending: EMERGENCY MEDICINE
Payer: MEDICAID

## 2022-05-28 VITALS
BODY MASS INDEX: 48.82 KG/M2 | OXYGEN SATURATION: 99 % | WEIGHT: 293 LBS | SYSTOLIC BLOOD PRESSURE: 177 MMHG | DIASTOLIC BLOOD PRESSURE: 118 MMHG | RESPIRATION RATE: 18 BRPM | HEIGHT: 65 IN | TEMPERATURE: 99 F | HEART RATE: 85 BPM
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
## 2022-05-28 DIAGNOSIS — M25.569 KNEE PAIN: ICD-10-CM

## 2022-05-28 DIAGNOSIS — M25.529 ELBOW PAIN: ICD-10-CM

## 2022-05-28 DIAGNOSIS — S83.92XA SPRAIN OF LEFT KNEE, UNSPECIFIED LIGAMENT, INITIAL ENCOUNTER: ICD-10-CM

## 2022-05-28 DIAGNOSIS — M54.50 LUMBAR PAIN: ICD-10-CM

## 2022-05-28 LAB
B-HCG UR QL: NEGATIVE
CTP QC/QA: YES

## 2022-05-28 PROCEDURE — 81025 URINE PREGNANCY TEST: CPT | Performed by: EMERGENCY MEDICINE

## 2022-05-28 PROCEDURE — 25000003 PHARM REV CODE 250: Performed by: PHYSICIAN ASSISTANT

## 2022-05-28 PROCEDURE — 96372 THER/PROPH/DIAG INJ SC/IM: CPT | Performed by: PHYSICIAN ASSISTANT

## 2022-05-28 PROCEDURE — 63600175 PHARM REV CODE 636 W HCPCS: Performed by: PHYSICIAN ASSISTANT

## 2022-05-28 PROCEDURE — 99284 EMERGENCY DEPT VISIT MOD MDM: CPT | Mod: 25

## 2022-05-28 RX ORDER — KETOROLAC TROMETHAMINE 30 MG/ML
30 INJECTION, SOLUTION INTRAMUSCULAR; INTRAVENOUS
Status: COMPLETED | OUTPATIENT
Start: 2022-05-28 | End: 2022-05-28

## 2022-05-28 RX ORDER — CYCLOBENZAPRINE HCL 10 MG
10 TABLET ORAL 3 TIMES DAILY PRN
Qty: 20 TABLET | Refills: 0 | Status: SHIPPED | OUTPATIENT
Start: 2022-05-28 | End: 2022-06-04

## 2022-05-28 RX ORDER — LIDOCAINE 50 MG/G
1 PATCH TOPICAL DAILY
Qty: 15 PATCH | Refills: 0 | Status: SHIPPED | OUTPATIENT
Start: 2022-05-28 | End: 2022-06-12

## 2022-05-28 RX ORDER — AMLODIPINE BESYLATE 5 MG/1
5 TABLET ORAL
Status: COMPLETED | OUTPATIENT
Start: 2022-05-28 | End: 2022-05-28

## 2022-05-28 RX ORDER — ACETAMINOPHEN 500 MG
500 TABLET ORAL EVERY 4 HOURS PRN
Qty: 20 TABLET | Refills: 0 | Status: SHIPPED | OUTPATIENT
Start: 2022-05-28 | End: 2022-06-02

## 2022-05-28 RX ORDER — LOSARTAN POTASSIUM 25 MG/1
100 TABLET ORAL
Status: COMPLETED | OUTPATIENT
Start: 2022-05-28 | End: 2022-05-28

## 2022-05-28 RX ORDER — IBUPROFEN 600 MG/1
600 TABLET ORAL EVERY 6 HOURS PRN
Qty: 20 TABLET | Refills: 0 | Status: SHIPPED | OUTPATIENT
Start: 2022-05-28 | End: 2022-06-02

## 2022-05-28 RX ORDER — HYDROCHLOROTHIAZIDE 25 MG/1
25 TABLET ORAL
Status: COMPLETED | OUTPATIENT
Start: 2022-05-28 | End: 2022-05-28

## 2022-05-28 RX ADMIN — AMLODIPINE BESYLATE 5 MG: 5 TABLET ORAL at 09:05

## 2022-05-28 RX ADMIN — KETOROLAC TROMETHAMINE 30 MG: 30 INJECTION, SOLUTION INTRAMUSCULAR at 09:05

## 2022-05-28 RX ADMIN — HYDROCHLOROTHIAZIDE 25 MG: 25 TABLET ORAL at 09:05

## 2022-05-28 RX ADMIN — LOSARTAN POTASSIUM 100 MG: 25 TABLET, FILM COATED ORAL at 09:05

## 2022-05-28 NOTE — ED PROVIDER NOTES
Encounter Date: 5/28/2022    SCRIBE #1 NOTE: I, Nakia Gregg, am scribing for, and in the presence of,  Allie Rainey PA-C. I have scribed the following portions of the note - Other sections scribed: HPI, ROS, PE.       History     Chief Complaint   Patient presents with    Fall     Patient reports left knee, left lower back, and left arm pain following a fall 3 days ago. Patient states that she landed on tile floor, on the left side of her body. Denies hitting head, loc.      Anamaria Connell, a 36 y.o. female with a pertinent past medical history of arthritis and HTN, presents to the ED with constant left elbow pain, left knee pain, L lumbar back pain that began after a fall 3 days ago. 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 She fell on the left side of her body, but did not hit her head. She was seen by a clinic that day and took X-rays, but they were clear. Pt reports chronic numbness/ tingling of the knees and hands. Took Oxycodone and Vicodin with mild relief. Compliant with Amlodipine and a Losartan/ HCTZ   every morning. Did not take  today. No other exacerbating or alleviating factors. Patient denies bowel or bladder incontinence, urinary incontinence, chest pain, shortness of breath, syncope, dizziness, lightheadedness, or any other associated symptoms.     The history is provided by the patient. No  was used.     Review of patient's allergies indicates:  No Known Allergies  Past Medical History:   Diagnosis Date    Arthritis     Hypertension      Past Surgical History:   Procedure Laterality Date    HERNIA REPAIR      TUBAL LIGATION       History reviewed. No pertinent family history.  Social History     Tobacco Use    Smoking status: Never Smoker    Smokeless tobacco: Never Used   Substance Use Topics    Alcohol use: Yes     Comment: ocassionally    Drug use: No     Review of Systems   Constitutional: Negative for chills  and fever.   HENT: Negative for congestion, ear pain, nosebleeds, rhinorrhea, sore throat and trouble swallowing.    Eyes: Negative for redness.   Respiratory: Negative for cough, shortness of breath and stridor.    Cardiovascular: Negative for chest pain.   Gastrointestinal: Negative for abdominal pain, constipation, diarrhea, nausea and vomiting.   Genitourinary: Negative for decreased urine volume, dysuria, frequency, hematuria and urgency.        (-) Urinary incontinence   Musculoskeletal: Positive for arthralgias. Negative for back pain and neck pain.        (+) Left elbow pain  (+) Left knee pain  (+) Left lower back pain  (+) Chronic numbness/ tingling of the knees and hands   Skin: Negative for rash and wound.   Neurological: Negative for dizziness, syncope, speech difficulty, weakness, light-headedness, numbness and headaches.   Hematological: Does not bruise/bleed easily.   Psychiatric/Behavioral: Negative for confusion.       Physical Exam     Initial Vitals [05/28/22 0836]   BP Pulse Resp Temp SpO2   (!) 172/94 98 18 98 °F (36.7 °C) 97 %      MAP       --         Physical Exam    Nursing note and vitals reviewed.  Constitutional: She appears well-developed and well-nourished.   HENT:   Head: Normocephalic and atraumatic.   Eyes: EOM are normal. Pupils are equal, round, and reactive to light.   Neck: Neck supple. No thyromegaly present. No JVD present.   Normal range of motion.  Cardiovascular: Normal rate and regular rhythm. Exam reveals no gallop and no friction rub.    No murmur heard.  Pulses:       Radial pulses are 2+ on the right side and 2+ on the left side.        Dorsalis pedis pulses are 2+ on the right side and 2+ on the left side.   Pulmonary/Chest: Breath sounds normal. No respiratory distress.   Abdominal: Abdomen is soft. Bowel sounds are normal. There is no abdominal tenderness.   Musculoskeletal:         General: No edema. Normal range of motion.      Left elbow: Tenderness present.       Cervical back: Normal range of motion and neck supple.      Left knee: Swelling (medial joint line) present.      Comments: No midline tenderness. Tenderness to the left lumbar paraspinal musculature. TTP over the L elbow. Able to flex and extend left elbow. Negative varus and valgus tests.     Neurological: She is alert and oriented to person, place, and time. She has normal strength. GCS score is 15. GCS eye subscore is 4. GCS verbal subscore is 5. GCS motor subscore is 6.   Skin: Skin is warm and dry. Capillary refill takes less than 2 seconds.   Psychiatric: She has a normal mood and affect.         ED Course   Procedures  Labs Reviewed   POCT URINE PREGNANCY          Imaging Results          X-Ray Elbow Complete Left (Final result)  Result time 05/28/22 10:01:52    Final result by Jane Sanchez MD (05/28/22 10:01:52)                 Impression:      No abnormality identified.      Electronically signed by: Jane Sanchez MD  Date:    05/28/2022  Time:    10:01             Narrative:    EXAMINATION:  XR ELBOW COMPLETE 3 VIEW LEFT    CLINICAL HISTORY:  Pain in unspecified elbow    TECHNIQUE:  AP, lateral, and oblique views of the left elbow were performed.    COMPARISON:  None    FINDINGS:  There is no acute fracture, dislocation, or bone destruction.  No joint effusion is seen.  There is no significant degenerative change.                               X-Ray Lumbar Spine Ap And Lateral (Final result)  Result time 05/28/22 10:02:33    Final result by Jane Sanchez MD (05/28/22 10:02:33)                 Impression:      No abnormality identified.      Electronically signed by: Jane Sanchez MD  Date:    05/28/2022  Time:    10:02             Narrative:    EXAMINATION:  XR LUMBAR SPINE AP AND LATERAL    CLINICAL HISTORY:  BACK PAIN;    TECHNIQUE:  AP, lateral and spot images were performed of the lumbar spine.    COMPARISON:  None    FINDINGS:  Alignment is satisfactory.  Bone mineralization is  normal.  No acute fracture is seen.  Intervertebral disc spaces are well preserved.  No significant degenerative change or spondylolisthesis is detected.                               X-Ray Knee 3 View Left (Final result)  Result time 05/28/22 10:05:42    Final result by Jane Sanchez MD (05/28/22 10:05:42)                 Impression:      Severe osteoarthritis of left knee with joint effusion, similar to prior exam.      Electronically signed by: Jane Sanchez MD  Date:    05/28/2022  Time:    10:05             Narrative:    EXAMINATION:  XR KNEE 3 VIEW LEFT    CLINICAL HISTORY:  Pain in unspecified knee    TECHNIQUE:  AP, lateral, and Merchant views of the left knee were performed.    COMPARISON:  Prior dated 06/06/2021    FINDINGS:  There is no acute fracture, dislocation, or bone destruction.  There is severe tricompartmental joint space narrowing and marginal osteophytosis.  There is a joint effusion.  No acute fracture is detected.  Findings appear similar to the 06/06/2021 exam.                                 Medications   amLODIPine tablet 5 mg (5 mg Oral Given 5/28/22 0950)   losartan tablet 100 mg (100 mg Oral Given 5/28/22 0949)   hydroCHLOROthiazide tablet 25 mg (25 mg Oral Given 5/28/22 0950)   ketorolac injection 30 mg (30 mg Intramuscular Given 5/28/22 0950)     Medical Decision Making:   History:   Old Medical Records: I decided to obtain old medical records.  ED Management:  36-year-old female presenting status post mechanical slip and fall that occurred few days ago.  No head injury, LOC, neck pain.  Does report left lumbar back pain.  No midline tenderness of the spine.  Cervical spine neuro intact.  X-ray L-spine negative for fracture dislocation.   No neurovascular deficits.  Denies bowel or bladder incontinence, saddle anesthesia.  Considered doubt cauda equina, epidural abscess or spinal cord compression.  Her left elbow and left knee are negative for fracture dislocation.   Effusion and arthritis present on knee x-ray.  Likely knee sprain.  Will have her follow up with Orthopedics for further evaluation management.  Will discharge with medications for symptomatic treatment.  Blood pressure elevated in the emergency department.  Patient not take her medications at home prior to arrival.  Home medications ordered.  She is asymptomatic.  Denies chest pain, shortness breath, dizziness lightheadedness headache or other associated symptoms at this time.  Follow-up with primary care in 2 days.  Return ER for worsening symptoms or as needed          Scribe Attestation:   Scribe #1: I performed the above scribed service and the documentation accurately describes the services I performed. I attest to the accuracy of the note.                 Clinical Impression:   Final diagnoses:  [M25.529] Elbow pain  [M25.569] Knee pain  [W19.XXXA] Fall, initial encounter (Primary)  [M54.50] Lumbar pain  [S83.92XA] Sprain of left knee, unspecified ligament, initial encounter          ED Disposition Condition    Discharge Stable        ED Prescriptions     Medication Sig Dispense Start Date End Date Auth. Provider    ibuprofen (ADVIL,MOTRIN) 600 MG tablet Take 1 tablet (600 mg total) by mouth every 6 (six) hours as needed for Pain. 20 tablet 5/28/2022 6/2/2022 Allie Rainey PA-C    acetaminophen (TYLENOL) 500 MG tablet Take 1 tablet (500 mg total) by mouth every 4 (four) hours as needed. 20 tablet 5/28/2022 6/2/2022 Allie Rainey PA-C    cyclobenzaprine (FLEXERIL) 10 MG tablet Take 1 tablet (10 mg total) by mouth 3 (three) times daily as needed. 20 tablet 5/28/2022 6/4/2022 Allie Rainey PA-C    LIDOcaine (LIDODERM) 5 % Place 1 patch onto the skin once daily. Remove & Discard patch within 12 hours or as directed by MD. May use 4% over the counter if not covered by insurance for 15 days 15 patch 5/28/2022 6/12/2022 Allie Rainey PA-C        Follow-up Information     Follow up  With Specialties Details Why Contact Info    Your Primary Care Doctor  Schedule an appointment as soon as possible for a visit in 2 days      [facility] Emergency Dept Emergency Medicine Go to  As needed, If symptoms worsen [address] [place] [address] [phone]       I, Allie Rainey PA-C  personally performed the services described in this documentation. All medical record entries made by the scribe were at my direction and in my presence. I have reviewed the chart and agree that the record reflects my personal performance and is accurate and complete.       Allie Rainey PA-C  05/28/22 0212

## 2022-05-28 NOTE — Clinical Note
"Anamaria "Malcolm Connell was seen and treated in our emergency department on 5/28/2022.  She may return to work on 05/30/2022.       If you have any questions or concerns, please don't hesitate to call.      lAlie Rainey PA-C"

## 2022-05-28 NOTE — DISCHARGE INSTRUCTIONS

## 2022-10-20 ENCOUNTER — OFFICE VISIT (OUTPATIENT)
Dept: URGENT CARE | Facility: CLINIC | Age: 37
End: 2022-10-20
Payer: MEDICAID

## 2022-10-20 VITALS
BODY MASS INDEX: 51.59 KG/M2 | DIASTOLIC BLOOD PRESSURE: 109 MMHG | OXYGEN SATURATION: 96 % | WEIGHT: 293 LBS | HEART RATE: 104 BPM | SYSTOLIC BLOOD PRESSURE: 154 MMHG | TEMPERATURE: 98 F | RESPIRATION RATE: 18 BRPM

## 2022-10-20 DIAGNOSIS — J01.90 ACUTE BACTERIAL SINUSITIS: Primary | ICD-10-CM

## 2022-10-20 DIAGNOSIS — R68.84 JAW PAIN: ICD-10-CM

## 2022-10-20 DIAGNOSIS — I10 ELEVATED BLOOD PRESSURE READING IN OFFICE WITH DIAGNOSIS OF HYPERTENSION: ICD-10-CM

## 2022-10-20 DIAGNOSIS — B96.89 ACUTE BACTERIAL SINUSITIS: Primary | ICD-10-CM

## 2022-10-20 DIAGNOSIS — H60.502 ACUTE OTITIS EXTERNA OF LEFT EAR, UNSPECIFIED TYPE: ICD-10-CM

## 2022-10-20 PROCEDURE — 1160F RVW MEDS BY RX/DR IN RCRD: CPT | Mod: CPTII,S$GLB,, | Performed by: NURSE PRACTITIONER

## 2022-10-20 PROCEDURE — 3077F PR MOST RECENT SYSTOLIC BLOOD PRESSURE >= 140 MM HG: ICD-10-PCS | Mod: CPTII,S$GLB,, | Performed by: NURSE PRACTITIONER

## 2022-10-20 PROCEDURE — 99203 PR OFFICE/OUTPT VISIT, NEW, LEVL III, 30-44 MIN: ICD-10-PCS | Mod: S$GLB,,, | Performed by: NURSE PRACTITIONER

## 2022-10-20 PROCEDURE — 3080F DIAST BP >= 90 MM HG: CPT | Mod: CPTII,S$GLB,, | Performed by: NURSE PRACTITIONER

## 2022-10-20 PROCEDURE — 1159F MED LIST DOCD IN RCRD: CPT | Mod: CPTII,S$GLB,, | Performed by: NURSE PRACTITIONER

## 2022-10-20 PROCEDURE — 1159F PR MEDICATION LIST DOCUMENTED IN MEDICAL RECORD: ICD-10-PCS | Mod: CPTII,S$GLB,, | Performed by: NURSE PRACTITIONER

## 2022-10-20 PROCEDURE — 3080F PR MOST RECENT DIASTOLIC BLOOD PRESSURE >= 90 MM HG: ICD-10-PCS | Mod: CPTII,S$GLB,, | Performed by: NURSE PRACTITIONER

## 2022-10-20 PROCEDURE — 3077F SYST BP >= 140 MM HG: CPT | Mod: CPTII,S$GLB,, | Performed by: NURSE PRACTITIONER

## 2022-10-20 PROCEDURE — 1160F PR REVIEW ALL MEDS BY PRESCRIBER/CLIN PHARMACIST DOCUMENTED: ICD-10-PCS | Mod: CPTII,S$GLB,, | Performed by: NURSE PRACTITIONER

## 2022-10-20 PROCEDURE — 99203 OFFICE O/P NEW LOW 30 MIN: CPT | Mod: S$GLB,,, | Performed by: NURSE PRACTITIONER

## 2022-10-20 RX ORDER — NEOMYCIN SULFATE, POLYMYXIN B SULFATE AND HYDROCORTISONE 10; 3.5; 1 MG/ML; MG/ML; [USP'U]/ML
4 SUSPENSION/ DROPS AURICULAR (OTIC) 3 TIMES DAILY
Qty: 10 ML | Refills: 0 | Status: SHIPPED | OUTPATIENT
Start: 2022-10-20 | End: 2022-10-27

## 2022-10-20 RX ORDER — FERROUS SULFATE TAB 325 MG (65 MG ELEMENTAL FE) 325 (65 FE) MG
TAB ORAL
COMMUNITY
Start: 2022-09-20

## 2022-10-20 RX ORDER — ESCITALOPRAM OXALATE 10 MG/1
10 TABLET ORAL EVERY MORNING
COMMUNITY
Start: 2022-07-28

## 2022-10-20 RX ORDER — LOSARTAN POTASSIUM 100 MG/1
TABLET ORAL
COMMUNITY

## 2022-10-20 RX ORDER — CYCLOBENZAPRINE HCL 10 MG
10 TABLET ORAL 3 TIMES DAILY PRN
COMMUNITY

## 2022-10-20 RX ORDER — AMOXICILLIN AND CLAVULANATE POTASSIUM 875; 125 MG/1; MG/1
1 TABLET, FILM COATED ORAL 2 TIMES DAILY
Qty: 14 TABLET | Refills: 0 | Status: SHIPPED | OUTPATIENT
Start: 2022-10-20 | End: 2022-10-27

## 2022-10-20 NOTE — LETTER
October 20, 2022      [facility]  [address], [address]  [place] [address]  Phone: [phone]  Fax: [phone]       Patient: Anamaria Connell   YOB: 1985  Date of Visit: 10/20/2022    To Whom It May Concern:    Batsheva Connell  was at [facility] on 10/20/2022. The patient may return to work/school on 10/24/2022 with no restrictions. If you have any questions or concerns, or if I can be of further assistance, please do not hesitate to contact me.    Sincerely,    Rhonda Garcia NP

## 2022-10-20 NOTE — PATIENT INSTRUCTIONS
- You must understand that you have received an Urgent Care treatment only and that you may be released before all of your medical problems are known or treated.   - You, the patient, will arrange for follow up care as instructed.   - If your condition worsens or fails to improve we recommend that you receive another evaluation at the ER immediately or contact your PCP to discuss your concerns.   - You can call [phone] or [phone] to help schedule an appointment with the appropriate provider.    Drink plenty of fluids   Get lots of rest  Tylenol or ibuprofen for pain/fever  Mucinex DM for cough   Saline nasal rinses to irrigate sinus cavities  Warm salt water gargles for sore throat  Your blood pressure reading was elevated today. Monitor your blood pressure at home and keep a record. Take it at the same time every day, use the same cuff, and the same arm. Do not drink caffeine before taking your blood pressure and do not cross your legs while it is being take. If your pressure is consistently greater than 140/90, see your primary care provider and bring your log with your for review.

## 2022-10-20 NOTE — PROGRESS NOTES
Subjective:       Patient ID: Anamaria Connell is a 37 y.o. female.    Vitals:  weight is 140.6 kg (310 lb) (abnormal). Her tympanic temperature is 98.1 °F (36.7 °C). Her blood pressure is 154/109 (abnormal) and her pulse is 104. Her respiration is 18 and oxygen saturation is 96%.     Chief Complaint: Jaw Pain    Pt is a 36 yo female w/ c/o L sided facial pain. She states she has been having left side jaw pain for six days . She has been taking norco for her symptoms with no change. Pt does have a cracked tooth on that side.     Facial Pain  This is a new problem. The current episode started in the past 7 days. The problem occurs constantly. The problem has been unchanged. Associated symptoms include headaches. She has tried acetaminophen (narco) for the symptoms. The treatment provided mild relief.     Neurological:  Positive for headaches.     Objective:      Physical Exam   Constitutional: She is oriented to person, place, and time. She appears well-developed. She is cooperative.  Non-toxic appearance. She does not appear ill. No distress.   HENT:   Head: Normocephalic and atraumatic.       Ears:   Right Ear: Hearing, tympanic membrane, external ear and ear canal normal.   Left Ear: Hearing, tympanic membrane, external ear and ear canal normal.   Nose: Mucosal edema present. No rhinorrhea or nasal deformity. No epistaxis. Right sinus exhibits maxillary sinus tenderness and frontal sinus tenderness. Left sinus exhibits no maxillary sinus tenderness and no frontal sinus tenderness.   Mouth/Throat: Uvula is midline and mucous membranes are normal. No trismus in the jaw. Normal dentition. No uvula swelling. Posterior oropharyngeal erythema present. No oropharyngeal exudate or posterior oropharyngeal edema.       Eyes: Conjunctivae and lids are normal. No scleral icterus.   Neck: Trachea normal and phonation normal. Neck supple. No edema present. No erythema present. No neck rigidity present.   Cardiovascular: Normal  rate, regular rhythm, normal heart sounds and normal pulses.   Pulmonary/Chest: Effort normal and breath sounds normal. No respiratory distress. She has no decreased breath sounds. She has no rhonchi.   Abdominal: Normal appearance.   Musculoskeletal: Normal range of motion.         General: No deformity. Normal range of motion.   Neurological: She is alert and oriented to person, place, and time. She exhibits normal muscle tone. Coordination normal.   Skin: Skin is warm, dry, intact, not diaphoretic and not pale.   Psychiatric: Her speech is normal and behavior is normal. Judgment and thought content normal.   Nursing note and vitals reviewed.          Assessment:       1. Acute bacterial sinusitis    2. Jaw pain    3. Acute otitis externa of left ear, unspecified type    4. Elevated blood pressure reading in office with diagnosis of hypertension          Plan:         Acute bacterial sinusitis  -     amoxicillin-clavulanate 875-125mg (AUGMENTIN) 875-125 mg per tablet; Take 1 tablet by mouth 2 (two) times daily. for 7 days  Dispense: 14 tablet; Refill: 0    Jaw pain    Acute otitis externa of left ear, unspecified type  -     neomycin-polymyxin-hydrocortisone (CORTISPORIN) 3.5-10,000-1 mg/mL-unit/mL-% otic suspension; Place 4 drops into the left ear 3 (three) times daily. for 7 days  Dispense: 10 mL; Refill: 0    Elevated blood pressure reading in office with diagnosis of hypertension       Patient Instructions   - You must understand that you have received an Urgent Care treatment only and that you may be released before all of your medical problems are known or treated.   - You, the patient, will arrange for follow up care as instructed.   - If your condition worsens or fails to improve we recommend that you receive another evaluation at the ER immediately or contact your PCP to discuss your concerns.   - You can call [phone] or [phone] to help schedule an appointment with the appropriate  provider.    Drink plenty of fluids   Get lots of rest  Tylenol or ibuprofen for pain/fever  Mucinex DM for cough   Saline nasal rinses to irrigate sinus cavities  Warm salt water gargles for sore throat  Your blood pressure reading was elevated today. Monitor your blood pressure at home and keep a record. Take it at the same time every day, use the same cuff, and the same arm. Do not drink caffeine before taking your blood pressure and do not cross your legs while it is being take. If your pressure is consistently greater than 140/90, see your primary care provider and bring your log with your for review.

## 2022-12-08 ENCOUNTER — OFFICE VISIT (OUTPATIENT)
Dept: URGENT CARE | Facility: CLINIC | Age: 37
End: 2022-12-08
Payer: MEDICAID

## 2022-12-08 VITALS
BODY MASS INDEX: 48.82 KG/M2 | WEIGHT: 293 LBS | HEART RATE: 104 BPM | DIASTOLIC BLOOD PRESSURE: 110 MMHG | SYSTOLIC BLOOD PRESSURE: 183 MMHG | RESPIRATION RATE: 18 BRPM | TEMPERATURE: 100 F | OXYGEN SATURATION: 98 % | HEIGHT: 65 IN

## 2022-12-08 DIAGNOSIS — R03.0 ELEVATED BLOOD PRESSURE READING: ICD-10-CM

## 2022-12-08 DIAGNOSIS — H66.002 NON-RECURRENT ACUTE SUPPURATIVE OTITIS MEDIA OF LEFT EAR WITHOUT SPONTANEOUS RUPTURE OF TYMPANIC MEMBRANE: Primary | ICD-10-CM

## 2022-12-08 PROCEDURE — 99213 PR OFFICE/OUTPT VISIT, EST, LEVL III, 20-29 MIN: ICD-10-PCS | Mod: S$GLB,,, | Performed by: FAMILY MEDICINE

## 2022-12-08 PROCEDURE — 99213 OFFICE O/P EST LOW 20 MIN: CPT | Mod: S$GLB,,, | Performed by: FAMILY MEDICINE

## 2022-12-08 PROCEDURE — 3080F DIAST BP >= 90 MM HG: CPT | Mod: CPTII,S$GLB,, | Performed by: FAMILY MEDICINE

## 2022-12-08 PROCEDURE — 3077F PR MOST RECENT SYSTOLIC BLOOD PRESSURE >= 140 MM HG: ICD-10-PCS | Mod: CPTII,S$GLB,, | Performed by: FAMILY MEDICINE

## 2022-12-08 PROCEDURE — 3008F PR BODY MASS INDEX (BMI) DOCUMENTED: ICD-10-PCS | Mod: CPTII,S$GLB,, | Performed by: FAMILY MEDICINE

## 2022-12-08 PROCEDURE — 3008F BODY MASS INDEX DOCD: CPT | Mod: CPTII,S$GLB,, | Performed by: FAMILY MEDICINE

## 2022-12-08 PROCEDURE — 1159F MED LIST DOCD IN RCRD: CPT | Mod: CPTII,S$GLB,, | Performed by: FAMILY MEDICINE

## 2022-12-08 PROCEDURE — 3077F SYST BP >= 140 MM HG: CPT | Mod: CPTII,S$GLB,, | Performed by: FAMILY MEDICINE

## 2022-12-08 PROCEDURE — 4010F ACE/ARB THERAPY RXD/TAKEN: CPT | Mod: CPTII,S$GLB,, | Performed by: FAMILY MEDICINE

## 2022-12-08 PROCEDURE — 4010F PR ACE/ARB THEARPY RXD/TAKEN: ICD-10-PCS | Mod: CPTII,S$GLB,, | Performed by: FAMILY MEDICINE

## 2022-12-08 PROCEDURE — 1160F PR REVIEW ALL MEDS BY PRESCRIBER/CLIN PHARMACIST DOCUMENTED: ICD-10-PCS | Mod: CPTII,S$GLB,, | Performed by: FAMILY MEDICINE

## 2022-12-08 PROCEDURE — 3080F PR MOST RECENT DIASTOLIC BLOOD PRESSURE >= 90 MM HG: ICD-10-PCS | Mod: CPTII,S$GLB,, | Performed by: FAMILY MEDICINE

## 2022-12-08 PROCEDURE — 1160F RVW MEDS BY RX/DR IN RCRD: CPT | Mod: CPTII,S$GLB,, | Performed by: FAMILY MEDICINE

## 2022-12-08 PROCEDURE — 1159F PR MEDICATION LIST DOCUMENTED IN MEDICAL RECORD: ICD-10-PCS | Mod: CPTII,S$GLB,, | Performed by: FAMILY MEDICINE

## 2022-12-08 RX ORDER — KETOROLAC TROMETHAMINE 30 MG/ML
30 INJECTION, SOLUTION INTRAMUSCULAR; INTRAVENOUS
Status: COMPLETED | OUTPATIENT
Start: 2022-12-08 | End: 2022-12-08

## 2022-12-08 RX ORDER — AMOXICILLIN AND CLAVULANATE POTASSIUM 875; 125 MG/1; MG/1
1 TABLET, FILM COATED ORAL EVERY 12 HOURS
Qty: 14 TABLET | Refills: 0 | Status: SHIPPED | OUTPATIENT
Start: 2022-12-08 | End: 2022-12-15

## 2022-12-08 RX ADMIN — KETOROLAC TROMETHAMINE 30 MG: 30 INJECTION, SOLUTION INTRAMUSCULAR; INTRAVENOUS at 06:12

## 2022-12-09 NOTE — PROGRESS NOTES
"Subjective:       Patient ID: Anamaria Connell is a 37 y.o. female.    Vitals:  height is 5' 5" (1.651 m) and weight is 140.6 kg (310 lb) (abnormal). Her tympanic temperature is 99.6 °F (37.6 °C). Her blood pressure is 183/110 (abnormal) and her pulse is 104. Her respiration is 18 and oxygen saturation is 98%.     Chief Complaint: Facial Pain    Pt states that she having left facial pain, left ear pain and neck pain that has been going on for 2 months. Pt states that he symptoms have been coming and going . Pt states that she does have a bad tooth on the left side of mouth . Pain level 10 and taking motrin 800mg    She also says her left ear has been ringing x 1 month, denies any fever or chills.     Facial Pain  This is a new problem. The current episode started more than 1 month ago. The problem occurs intermittently. The problem has been unchanged. Associated symptoms include headaches and neck pain. Pertinent negatives include no chest pain, chills, congestion, fatigue, fever, numbness, rash, sore throat, swollen glands, urinary symptoms, vertigo, visual change, vomiting or weakness. She has tried NSAIDs and ice for the symptoms.     Constitution: Negative for activity change, appetite change, chills, fatigue, fever, unexpected weight change and generalized weakness.   HENT:  Positive for ear pain, tinnitus, dental problem and facial swelling (intermittent, denies at this time.). Negative for ear discharge, foreign body in ear, hearing loss, tongue pain, tongue lesion, facial trauma, congestion, nosebleeds and sore throat.    Neck: Positive for neck pain. Negative for neck stiffness.   Cardiovascular:  Negative for chest trauma and chest pain.   Eyes:  Negative for eye pain, eye redness and photophobia.   Gastrointestinal:  Negative for vomiting.   Skin:  Negative for rash.   Neurological:  Positive for headaches. Negative for dizziness, history of vertigo, light-headedness, passing out, facial drooping, speech " difficulty, coordination disturbances, loss of balance, history of migraines, disorientation, altered mental status, loss of consciousness, numbness, tingling, seizures and tremors.   Psychiatric/Behavioral:  Negative for altered mental status and disorientation.      Objective:      Physical Exam   Constitutional: She is oriented to person, place, and time. She appears well-developed.  Non-toxic appearance. She does not appear ill. No distress. obesity  HENT:   Head: Normocephalic and atraumatic.   Ears:   Right Ear: External ear normal. Tympanic membrane is not perforated and not erythematous. No middle ear effusion.   Left Ear: External ear normal. No swelling or tenderness. Tympanic membrane is perforated and erythematous. A middle ear effusion is present.   Nose: Nose normal. No mucosal edema or rhinorrhea.   Mouth/Throat: Uvula is midline, oropharynx is clear and moist and mucous membranes are normal. She does not have dentures. No oral lesions. No trismus in the jaw. Normal dentition. No dental abscesses, uvula swelling or dental caries. No oropharyngeal exudate, posterior oropharyngeal edema, posterior oropharyngeal erythema, tonsillar abscesses or cobblestoning.   Eyes: Conjunctivae, EOM and lids are normal. Pupils are equal, round, and reactive to light.   Neck: Trachea normal and phonation normal. Neck supple.   Musculoskeletal: Normal range of motion.         General: Normal range of motion.   Neurological: no focal deficit. She is alert and oriented to person, place, and time. She has normal motor skills and normal sensation. Gait and coordination normal.   Skin: Skin is warm, dry, intact and not diaphoretic.   Psychiatric: Her speech is normal and behavior is normal. Judgment and thought content normal.   Nursing note and vitals reviewed.      Assessment:       1. Non-recurrent acute suppurative otitis media of left ear without spontaneous rupture of tympanic membrane    2. Elevated blood pressure  reading          Plan:       No facial swelling, no facial or jaw tenderness, no trismus.  Unsure if this was related to the AOM, have advised her to follow-up with dental as well as she is concerned about any dental problems, but no obvious abscess noted on exam.  No teeth pain to percussion.  Reviewed high blood pressure with her, advised to continue to monitor and follow-up with PCP in 1-2 weeks for further evaluation.    Discussed results/diagnosis/plan with patient in clinic. Strict precautions given to patient to monitor for worsening signs and symptoms. Advised to follow up with PCP or specialist.    Explained side effects of medications prescribed with patient and informed him/her to discontinue use if he/she has any side effects and to inform UC or PCP if this occurs. All questions answered. Strict ED verses clinic return precautions stressed and given in depth. Advised if symptoms worsens of fail to improve he/she should go to the Emergency Room. Discharge and follow-up instructions given verbally/printed with the patient who expressed understanding and willingness to comply with my recommendations. Patient voiced understanding and in agreement with current treatment plan. Patient exits the exam room in no acute distress. Conversant and engaged during discharge discussion, verbalized understanding.      Non-recurrent acute suppurative otitis media of left ear without spontaneous rupture of tympanic membrane  -     amoxicillin-clavulanate 875-125mg (AUGMENTIN) 875-125 mg per tablet; Take 1 tablet by mouth every 12 (twelve) hours. for 7 days  Dispense: 14 tablet; Refill: 0  -     ketorolac injection 30 mg    Elevated blood pressure reading  Comments:  reviewed, log, fu with pcp 1-2 weeks               Patient Instructions   General Discharge Instructions   PLEASE READ YOUR DISCHARGE INSTRUCTIONS ENTIRELY AS IT CONTAINS IMPORTANT INFORMATION.  If you were prescribed a narcotic or controlled medication, do not  drive or operate heavy equipment or machinery while taking these medications.  If you were prescribed antibiotics, please take them to completion.  You must understand that you've received an Urgent Care treatment only and that you may be released before all your medical problems are known or treated. You, the patient, will arrange for follow up care as instructed.    OVER THE COUNTER RECOMMENDATIONS/SUGGESTIONS.    Make sure to stay well hydrated.    Use Nasal Saline to mechanically move any post nasal drip from your eustachian tube or from the back of your throat.    Use warm salt water gargles to ease your throat pain. Warm salt water gargles as needed for sore throat- 1/2 tsp salt to 1 cup warm water, gargle as desired.    Use an antihistamine such as Claritin, Zyrtec or Allegra to dry you out.    Use pseudoephedrine (behind the counter) to decongest. Pseudoephedrine 30 mg up to 240 mg /day. It can raise your blood pressure and give you palpitations.    Use mucinex (guaifenesin) to break up mucous up to 2400mg/day to loosen any mucous.    The mucinex DM pill has a cough suppressant that can be sedating. It can be used at night to stop the tickle at the back of your throat.    You can use Mucinex D (it has guaifenesin and a high dose of pseudoephedrine) in the mornings to help decongest.    Use Afrin in each nare for no longer than 3 days, as it is addictive. It can also dry out your mucous membranes and cause elevated blood pressure. This is especially useful if you are flying.    Use Flonase 1-2 sprays/nostril per day. It is a local acting steroid nasal spray, if you develop a bloody nose, stop using the medication immediately.    Sometimes Nyquil at night is beneficial to help you get some rest, however it is sedating and it does have an antihistamine, and tylenol.    Honey is a natural cough suppressant that can be used.    Tylenol up to 4,000 mg a day is safe for short periods and can be used for body aches,  pain, and fever. However in high doses and prolonged use it can cause liver irritation.    Ibuprofen is a non-steroidal anti-inflammatory that can be used for body aches, pain, and fever.However it can also cause stomach irritation if over used.     Follow up with your PCP or specialty clinic as instructed in the next 2-3 days if not improved or as needed. You can call [phone] to schedule an appointment with appropriate provider.      If you condition worsens, we recommend that you receive another evaluation at the emergency room immediately or contact your primary medical clinic's after hours call service to discuss your concerns.      Please return here or go to the Emergency Department for any concerns or worsening condition.   You can also call [phone] to schedule an appointment with the appropriate provider.    Please return here or go to the Emergency Department for any concerns or worsening of condition.    Thank you for choosing [facility]!    Our goal in the Urgent Care is to always provide outstanding medical care. You may receive a survey by mail or e-mail in the next week regarding your experience today. We would greatly appreciate you completing and returning the survey. Your feedback provides us with a way to recognize our staff who provide very good care, and it helps us learn how to improve when your experience was below our aspiration of excellence.      We appreciate you trusting us with your medical care. We hope you feel better soon. We will be happy to take care of you for all of your future medical needs.    Sincerely,    BROOKE Yan  Elevated Blood Pressure Reading  Please be aware your blood pressure was slightly elevated today.  You should check your blood pressure twice a day for the next 2 weeks and keep a log.    Write all of those blood pressures down and record the time that they were taken.  Keep all that information and take it with you to see your  Primary Care Physician.  If your blood pressure is consistently above 140/90 you will need to follow up with your PCP more quickly.   It is important to minimize intake of salt, fried foods and caffeinated drinks. Also daily cardio(walking, running, treadmill, swimming, cycling) helps control blood pressure.   I recommend you schedule an appointment with your PCP in the next 2-3 days for a recheck of your blood pressure.  If you have any chest pain, shortness or breath, palpitations, headache, visual disturbances, ect, you should go to the ER immediately for further evaluation.

## 2022-12-09 NOTE — PATIENT INSTRUCTIONS
General Discharge Instructions   PLEASE READ YOUR DISCHARGE INSTRUCTIONS ENTIRELY AS IT CONTAINS IMPORTANT INFORMATION.  If you were prescribed a narcotic or controlled medication, do not drive or operate heavy equipment or machinery while taking these medications.  If you were prescribed antibiotics, please take them to completion.  You must understand that you've received an Urgent Care treatment only and that you may be released before all your medical problems are known or treated. You, the patient, will arrange for follow up care as instructed.    OVER THE COUNTER RECOMMENDATIONS/SUGGESTIONS.    Make sure to stay well hydrated.    Use Nasal Saline to mechanically move any post nasal drip from your eustachian tube or from the back of your throat.    Use warm salt water gargles to ease your throat pain. Warm salt water gargles as needed for sore throat- 1/2 tsp salt to 1 cup warm water, gargle as desired.    Use an antihistamine such as Claritin, Zyrtec or Allegra to dry you out.    Use pseudoephedrine (behind the counter) to decongest. Pseudoephedrine 30 mg up to 240 mg /day. It can raise your blood pressure and give you palpitations.    Use mucinex (guaifenesin) to break up mucous up to 2400mg/day to loosen any mucous.    The mucinex DM pill has a cough suppressant that can be sedating. It can be used at night to stop the tickle at the back of your throat.    You can use Mucinex D (it has guaifenesin and a high dose of pseudoephedrine) in the mornings to help decongest.    Use Afrin in each nare for no longer than 3 days, as it is addictive. It can also dry out your mucous membranes and cause elevated blood pressure. This is especially useful if you are flying.    Use Flonase 1-2 sprays/nostril per day. It is a local acting steroid nasal spray, if you develop a bloody nose, stop using the medication immediately.    Sometimes Nyquil at night is beneficial to help you get some rest, however it is sedating and it  does have an antihistamine, and tylenol.    Honey is a natural cough suppressant that can be used.    Tylenol up to 4,000 mg a day is safe for short periods and can be used for body aches, pain, and fever. However in high doses and prolonged use it can cause liver irritation.    Ibuprofen is a non-steroidal anti-inflammatory that can be used for body aches, pain, and fever.However it can also cause stomach irritation if over used.     Follow up with your PCP or specialty clinic as instructed in the next 2-3 days if not improved or as needed. You can call [phone] to schedule an appointment with appropriate provider.      If you condition worsens, we recommend that you receive another evaluation at the emergency room immediately or contact your primary medical clinic's after hours call service to discuss your concerns.      Please return here or go to the Emergency Department for any concerns or worsening condition.   You can also call [phone] to schedule an appointment with the appropriate provider.    Please return here or go to the Emergency Department for any concerns or worsening of condition.    Thank you for choosing [facility]!    Our goal in the Urgent Care is to always provide outstanding medical care. You may receive a survey by mail or e-mail in the next week regarding your experience today. We would greatly appreciate you completing and returning the survey. Your feedback provides us with a way to recognize our staff who provide very good care, and it helps us learn how to improve when your experience was below our aspiration of excellence.      We appreciate you trusting us with your medical care. We hope you feel better soon. We will be happy to take care of you for all of your future medical needs.    Sincerely,    BROOKE Yan  Elevated Blood Pressure Reading  Please be aware your blood pressure was slightly elevated today.  You should check your blood pressure twice a  day for the next 2 weeks and keep a log.    Write all of those blood pressures down and record the time that they were taken.  Keep all that information and take it with you to see your Primary Care Physician.  If your blood pressure is consistently above 140/90 you will need to follow up with your PCP more quickly.   It is important to minimize intake of salt, fried foods and caffeinated drinks. Also daily cardio(walking, running, treadmill, swimming, cycling) helps control blood pressure.   I recommend you schedule an appointment with your PCP in the next 2-3 days for a recheck of your blood pressure.  If you have any chest pain, shortness or breath, palpitations, headache, visual disturbances, ect, you should go to the ER immediately for further evaluation.

## 2023-01-06 ENCOUNTER — OFFICE VISIT (OUTPATIENT)
Dept: URGENT CARE | Facility: CLINIC | Age: 38
End: 2023-01-06
Payer: MEDICAID

## 2023-01-06 VITALS
WEIGHT: 293 LBS | RESPIRATION RATE: 18 BRPM | TEMPERATURE: 97 F | HEART RATE: 83 BPM | DIASTOLIC BLOOD PRESSURE: 104 MMHG | OXYGEN SATURATION: 96 % | BODY MASS INDEX: 51.59 KG/M2 | SYSTOLIC BLOOD PRESSURE: 151 MMHG

## 2023-01-06 DIAGNOSIS — U07.1 COVID-19 VIRUS INFECTION: Primary | ICD-10-CM

## 2023-01-06 LAB
CTP QC/QA: YES
SARS-COV-2 AG RESP QL IA.RAPID: POSITIVE

## 2023-01-06 PROCEDURE — 3080F PR MOST RECENT DIASTOLIC BLOOD PRESSURE >= 90 MM HG: ICD-10-PCS | Mod: CPTII,S$GLB,, | Performed by: NURSE PRACTITIONER

## 2023-01-06 PROCEDURE — 3080F DIAST BP >= 90 MM HG: CPT | Mod: CPTII,S$GLB,, | Performed by: NURSE PRACTITIONER

## 2023-01-06 PROCEDURE — 1160F PR REVIEW ALL MEDS BY PRESCRIBER/CLIN PHARMACIST DOCUMENTED: ICD-10-PCS | Mod: CPTII,S$GLB,, | Performed by: NURSE PRACTITIONER

## 2023-01-06 PROCEDURE — 87811 SARS CORONAVIRUS 2 ANTIGEN POCT, MANUAL READ: ICD-10-PCS | Mod: QW,S$GLB,, | Performed by: NURSE PRACTITIONER

## 2023-01-06 PROCEDURE — 99214 PR OFFICE/OUTPT VISIT, EST, LEVL IV, 30-39 MIN: ICD-10-PCS | Mod: S$GLB,,, | Performed by: NURSE PRACTITIONER

## 2023-01-06 PROCEDURE — 1159F PR MEDICATION LIST DOCUMENTED IN MEDICAL RECORD: ICD-10-PCS | Mod: CPTII,S$GLB,, | Performed by: NURSE PRACTITIONER

## 2023-01-06 PROCEDURE — 87811 SARS-COV-2 COVID19 W/OPTIC: CPT | Mod: QW,S$GLB,, | Performed by: NURSE PRACTITIONER

## 2023-01-06 PROCEDURE — 1160F RVW MEDS BY RX/DR IN RCRD: CPT | Mod: CPTII,S$GLB,, | Performed by: NURSE PRACTITIONER

## 2023-01-06 PROCEDURE — 99214 OFFICE O/P EST MOD 30 MIN: CPT | Mod: S$GLB,,, | Performed by: NURSE PRACTITIONER

## 2023-01-06 PROCEDURE — 1159F MED LIST DOCD IN RCRD: CPT | Mod: CPTII,S$GLB,, | Performed by: NURSE PRACTITIONER

## 2023-01-06 PROCEDURE — 3077F SYST BP >= 140 MM HG: CPT | Mod: CPTII,S$GLB,, | Performed by: NURSE PRACTITIONER

## 2023-01-06 PROCEDURE — 3077F PR MOST RECENT SYSTOLIC BLOOD PRESSURE >= 140 MM HG: ICD-10-PCS | Mod: CPTII,S$GLB,, | Performed by: NURSE PRACTITIONER

## 2023-01-06 PROCEDURE — 3008F BODY MASS INDEX DOCD: CPT | Mod: CPTII,S$GLB,, | Performed by: NURSE PRACTITIONER

## 2023-01-06 PROCEDURE — 3008F PR BODY MASS INDEX (BMI) DOCUMENTED: ICD-10-PCS | Mod: CPTII,S$GLB,, | Performed by: NURSE PRACTITIONER

## 2023-01-06 RX ORDER — BENZONATATE 200 MG/1
200 CAPSULE ORAL 3 TIMES DAILY PRN
Qty: 30 CAPSULE | Refills: 0 | Status: SHIPPED | OUTPATIENT
Start: 2023-01-06 | End: 2024-02-10

## 2023-01-06 RX ORDER — AZELASTINE 1 MG/ML
1 SPRAY, METERED NASAL 2 TIMES DAILY
Qty: 30 ML | Refills: 0 | Status: SHIPPED | OUTPATIENT
Start: 2023-01-06 | End: 2023-01-16

## 2023-01-06 RX ORDER — ACETAMINOPHEN 500 MG
1000 TABLET ORAL EVERY 8 HOURS
COMMUNITY
Start: 2022-11-03 | End: 2023-11-05

## 2023-01-06 RX ORDER — IBUPROFEN 800 MG/1
800 TABLET ORAL EVERY 8 HOURS
COMMUNITY
Start: 2022-11-03 | End: 2023-11-05

## 2023-01-06 RX ORDER — MELOXICAM 7.5 MG/1
7.5 TABLET ORAL DAILY PRN
COMMUNITY
Start: 2022-11-21 | End: 2023-02-19

## 2023-01-06 RX ORDER — PROMETHAZINE HYDROCHLORIDE AND DEXTROMETHORPHAN HYDROBROMIDE 6.25; 15 MG/5ML; MG/5ML
5 SYRUP ORAL NIGHTLY PRN
Qty: 118 ML | Refills: 0 | Status: SHIPPED | OUTPATIENT
Start: 2023-01-06 | End: 2024-02-10

## 2023-01-06 RX ORDER — ERGOCALCIFEROL 1.25 MG/1
50000 CAPSULE ORAL
COMMUNITY
Start: 2022-11-28 | End: 2023-02-26

## 2023-01-06 RX ORDER — OMEPRAZOLE 20 MG/1
20 CAPSULE, DELAYED RELEASE ORAL
COMMUNITY
Start: 2022-11-21 | End: 2023-11-21

## 2023-01-06 RX ORDER — FLUTICASONE PROPIONATE 50 MCG
1 SPRAY, SUSPENSION (ML) NASAL 2 TIMES DAILY
Qty: 9.9 ML | Refills: 0 | Status: SHIPPED | OUTPATIENT
Start: 2023-01-06 | End: 2024-02-10

## 2023-01-06 NOTE — LETTER
[address] [facility], [address] ? [place], [address] ? Phone [phone] ? Fax [phone]           Return to Work/School    Patient: Anamaria Connell  YOB: 1985   Date: 01/06/2023      To Whom It May Concern:     Anamaria Connell was in contact with/seen in my office on 01/06/2023. COVID-19 is present in our communities across the [facility]. Not all patients are eligible or appropriate to be tested. In this situation, your employee meets the following criteria:     Anamaria Connell has met the criteria for COVID-19 testing and has a POSITIVE result. She can return to work once they are asymptomatic for 24 hours without the use of fever reducing medications AND at least five days from the start of symptoms (or from the first positive result if they have no symptoms).      If you have any questions or concerns, or if I can be of further assistance, please do not hesitate to contact me.     Sincerely,    Chris Virk NP

## 2023-01-06 NOTE — PATIENT INSTRUCTIONS
- Follow up with your PCP or specialty clinic as directed in the next 1-2 weeks if not improved or as needed.  You can call [phone] to schedule an appointment with the appropriate provider.    - Go to the ER or seek medical attention immediately if you develop new or worsening symptoms.    - You must understand that you have received an Urgent Care treatment only and that you may be released before all of your medical problems are known or treated.   - You, the patient, will arrange for follow up care as instructed.   - If your condition worsens or fails to improve we recommend that you receive another evaluation at the ER immediately or contact your PCP to discuss your concerns or return here.     Recommend daily antihistamine (Claritin, Zyrtec, or Allegra), decongestant (Mucinex, or Coricidin if hx of HTN), cough suppressant, Nasal spray (Flonase), Tylenol (if not contraindicated) for pain or fever, along with plenty of fluids and rest. Discussed POSITIVE Covid result w/ patient. Discussed quarantine instructions. Patient verbally understood and agreed with treatment plan. ER for worsening symptoms.     You have tested POSITIVE for COVID-19 today.           ISOLATION    If you tested positive and do not have symptoms, you must isolate for 5 days starting on the day of the positive test. I       If you tested positive and have symptoms, you must isolate for 5 days starting on the day of the first symptoms,  not the day of the positive test.       This is the most important part, both the CDC and the [facility] emphasize that you do not test out of isolation.       After 5 days, if your symptoms have improved and you have not had fever on day 5, you can return to the community on day 6- NO TESTING REQUIRED!        In fact, we do not retest if you were positive in the last 90 days.       After your 5 days of isolation are completed, the CDC recommends strict mask use for the first 5 days that you come out of  isolation.     CDC Testing and Quarantine Guidelines for patients with exposure to a known-positive COVID-19 person:    ·     A 'close exposure' is defined as anyone who has had an exposure (masked or unmasked) to a known COVID -19 positive person            within 6 feet of someone            for a cumulative total of 15 minutes or more over a 24-hour period.    ·     vaccinated Have been boosted or completed the primary series of Pfizer or Moderna vaccine within the last 6 months or completed the primary series of J&J vaccine within the last 2 months and/or had a positive test within 90 days            do NOT need to quarantine after contact with someone who had COVID-19 unless they have symptoms.            fully vaccinated people who have not had a positive test within 90 days, should get tested 3-5 days after their exposure, even if they don't have symptoms and wear a mask indoors in public for 10 days following exposure or until their test result is negative on day 5.            If you develop symptoms test and quarantine.         ·     Unvaccinated, or are more than six months out from their second mRNA dose (or more than 2 months after the J&J vaccine) and not yet boosted,  and/or NOT had a positive test within 90 days and meet 'close exposure'    you are required by CDC guidelines to quarantine for at least 5 days from time of exposure followed by 5 days of strict masking. It is recommended, but not required to test after 5 days, unless you develop symptoms, in which case you should test at that time.    If you do decide to test at 5 days and are asymptomatic, the risk is that if you test without symptoms on Day 5 for example) and you are positive, your 5 day isolation begins on that day, and you turned your 5 day quarantine into 10 days.            If your exposure does not meet the above definition, you can return to your normal daily activities to include social distancing, wearing a mask and frequent  handwashing.    Alternatively, if a 5-day quarantine is not feasible, it is imperative that an exposed person wear a well-fitting mask at all times when around others for 10 days after exposure.

## 2023-01-06 NOTE — PROGRESS NOTES
Subjective:       Patient ID: Anamaria Connell is a 37 y.o. female.    Vitals:  weight is 140.6 kg (310 lb) (abnormal). Her temperature is 96.8 °F (36 °C). Her blood pressure is 151/104 (abnormal) and her pulse is 83. Her respiration is 18 and oxygen saturation is 96%.     Chief Complaint: Cough    37-year-old female presents to clinic for evaluation of cough, congestion, sore throat, postnasal drip, headache x2 days.  Patient reports a positive home COVID test yesterday.  She denies any known exposures.  She has been taking Benadryl and TheraFlu.  She is vaccinated for COVID.  She denies chest pain, fever, shortness of breath.  She is awake and alert, answers questions appropriately, no acute distress noted on today's visit.    Cough  This is a new problem. The current episode started in the past 7 days. The problem has been unchanged. The problem occurs constantly. The cough is Non-productive. Associated symptoms include headaches, nasal congestion, postnasal drip and a sore throat. Pertinent negatives include no chest pain, chills, fever or shortness of breath. She has tried OTC cough suppressant for the symptoms. The treatment provided mild relief. There is no history of asthma or bronchitis.     Constitution: Negative for activity change, appetite change, chills, sweating, fatigue and fever.   HENT:  Positive for congestion, postnasal drip and sore throat.    Cardiovascular:  Negative for chest pain.   Respiratory:  Positive for cough. Negative for shortness of breath.    Gastrointestinal:  Negative for abdominal pain, nausea and vomiting.   Neurological:  Positive for headaches. Negative for dizziness.     Objective:      Physical Exam   Constitutional: She is oriented to person, place, and time.  Non-toxic appearance. She does not appear ill. No distress.   HENT:   Head: Normocephalic and atraumatic.   Eyes: Conjunctivae are normal. Right eye exhibits no discharge. Left eye exhibits no discharge.    Cardiovascular: Normal rate.   Pulmonary/Chest: Effort normal. No respiratory distress.   Abdominal: Normal appearance.   Neurological: She is alert and oriented to person, place, and time.   Skin: Skin is dry, not diaphoretic and not pale.   Psychiatric: Her behavior is normal. Mood, judgment and thought content normal.   Nursing note and vitals reviewed.      Results for orders placed or performed in visit on 01/06/23   SARS Coronavirus 2 Antigen, POCT Manual Read   Result Value Ref Range    SARS Coronavirus 2 Antigen Positive (A) Negative     Acceptable Yes      Due to the state of emergency surrounding the COVID-19 pandemic, the physical exam was limited per [facility]'s current protocol.     The patient was sociable and calm without any verbal evidence of acute distress. Was able to speak fluid sentences without labored breathing.  AAO x3.    Assessment:       1. COVID-19 virus infection          Plan:         COVID-19 virus infection  -     SARS Coronavirus 2 Antigen, POCT Manual Read  -     COVID-19 Home Symptom Monitoring  - Duration (days): 14  -     nirmatrelvir-ritonavir 300 mg (150 mg x 2)-100 mg copackaged tablets (EUA); Take 3 tablets by mouth 2 (two) times daily for 5 days. Each dose contains 2 nirmatrelvir (pink tablets) and 1 ritonavir (white tablet). Take all 3 tablets together  Dispense: 30 tablet; Refill: 0  -     fluticasone propionate (FLONASE) 50 mcg/actuation nasal spray; 1 spray (50 mcg total) by Each Nostril route 2 (two) times daily.  Dispense: 9.9 mL; Refill: 0  -     azelastine (ASTELIN) 137 mcg (0.1 %) nasal spray; 1 spray (137 mcg total) by Nasal route 2 (two) times daily. for 10 days  Dispense: 30 mL; Refill: 0  -     benzonatate (TESSALON) 200 MG capsule; Take 1 capsule (200 mg total) by mouth 3 (three) times daily as needed for Cough.  Dispense: 30 capsule; Refill: 0  -     promethazine-dextromethorphan (PROMETHAZINE-DM) 6.25-15 mg/5 mL Syrp; Take 5 mLs by mouth  nightly as needed (cough).  Dispense: 118 mL; Refill: 0      Patient Instructions   - Follow up with your PCP or specialty clinic as directed in the next 1-2 weeks if not improved or as needed.  You can call [phone] to schedule an appointment with the appropriate provider.    - Go to the ER or seek medical attention immediately if you develop new or worsening symptoms.    - You must understand that you have received an Urgent Care treatment only and that you may be released before all of your medical problems are known or treated.   - You, the patient, will arrange for follow up care as instructed.   - If your condition worsens or fails to improve we recommend that you receive another evaluation at the ER immediately or contact your PCP to discuss your concerns or return here.     Recommend daily antihistamine (Claritin, Zyrtec, or Allegra), decongestant (Mucinex, or Coricidin if hx of HTN), cough suppressant, Nasal spray (Flonase), Tylenol (if not contraindicated) for pain or fever, along with plenty of fluids and rest. Discussed POSITIVE Covid result w/ patient. Discussed quarantine instructions. Patient verbally understood and agreed with treatment plan. ER for worsening symptoms.     You have tested POSITIVE for COVID-19 today.           ISOLATION    If you tested positive and do not have symptoms, you must isolate for 5 days starting on the day of the positive test. I       If you tested positive and have symptoms, you must isolate for 5 days starting on the day of the first symptoms,  not the day of the positive test.       This is the most important part, both the CDC and the [facility] emphasize that you do not test out of isolation.       After 5 days, if your symptoms have improved and you have not had fever on day 5, you can return to the community on day 6- NO TESTING REQUIRED!        In fact, we do not retest if you were positive in the last 90 days.       After your 5 days of isolation are completed,  the CDC recommends strict mask use for the first 5 days that you come out of isolation.     CDC Testing and Quarantine Guidelines for patients with exposure to a known-positive COVID-19 person:    ·     A 'close exposure' is defined as anyone who has had an exposure (masked or unmasked) to a known COVID -19 positive person            within 6 feet of someone            for a cumulative total of 15 minutes or more over a 24-hour period.    ·     vaccinated Have been boosted or completed the primary series of Pfizer or Moderna vaccine within the last 6 months or completed the primary series of J&J vaccine within the last 2 months and/or had a positive test within 90 days            do NOT need to quarantine after contact with someone who had COVID-19 unless they have symptoms.            fully vaccinated people who have not had a positive test within 90 days, should get tested 3-5 days after their exposure, even if they don't have symptoms and wear a mask indoors in public for 10 days following exposure or until their test result is negative on day 5.            If you develop symptoms test and quarantine.         ·     Unvaccinated, or are more than six months out from their second mRNA dose (or more than 2 months after the J&J vaccine) and not yet boosted,  and/or NOT had a positive test within 90 days and meet 'close exposure'    you are required by CDC guidelines to quarantine for at least 5 days from time of exposure followed by 5 days of strict masking. It is recommended, but not required to test after 5 days, unless you develop symptoms, in which case you should test at that time.    If you do decide to test at 5 days and are asymptomatic, the risk is that if you test without symptoms on Day 5 for example) and you are positive, your 5 day isolation begins on that day, and you turned your 5 day quarantine into 10 days.            If your exposure does not meet the above definition, you can return to your normal  daily activities to include social distancing, wearing a mask and frequent handwashing.    Alternatively, if a 5-day quarantine is not feasible, it is imperative that an exposed person wear a well-fitting mask at all times when around others for 10 days after exposure.

## 2023-02-27 ENCOUNTER — OFFICE VISIT (OUTPATIENT)
Dept: URGENT CARE | Facility: CLINIC | Age: 38
End: 2023-02-27
Payer: MEDICAID

## 2023-02-27 VITALS
SYSTOLIC BLOOD PRESSURE: 177 MMHG | DIASTOLIC BLOOD PRESSURE: 79 MMHG | WEIGHT: 293 LBS | RESPIRATION RATE: 18 BRPM | OXYGEN SATURATION: 95 % | BODY MASS INDEX: 48.82 KG/M2 | HEART RATE: 81 BPM | TEMPERATURE: 98 F | HEIGHT: 65 IN

## 2023-02-27 DIAGNOSIS — G89.29 CHRONIC LEFT-SIDED LOW BACK PAIN WITH LEFT-SIDED SCIATICA: Primary | ICD-10-CM

## 2023-02-27 DIAGNOSIS — M54.42 CHRONIC LEFT-SIDED LOW BACK PAIN WITH LEFT-SIDED SCIATICA: Primary | ICD-10-CM

## 2023-02-27 PROCEDURE — 3008F PR BODY MASS INDEX (BMI) DOCUMENTED: ICD-10-PCS | Mod: CPTII,S$GLB,, | Performed by: NURSE PRACTITIONER

## 2023-02-27 PROCEDURE — 1160F PR REVIEW ALL MEDS BY PRESCRIBER/CLIN PHARMACIST DOCUMENTED: ICD-10-PCS | Mod: CPTII,S$GLB,, | Performed by: NURSE PRACTITIONER

## 2023-02-27 PROCEDURE — 99213 OFFICE O/P EST LOW 20 MIN: CPT | Mod: S$GLB,,, | Performed by: NURSE PRACTITIONER

## 2023-02-27 PROCEDURE — 1159F MED LIST DOCD IN RCRD: CPT | Mod: CPTII,S$GLB,, | Performed by: NURSE PRACTITIONER

## 2023-02-27 PROCEDURE — 99213 PR OFFICE/OUTPT VISIT, EST, LEVL III, 20-29 MIN: ICD-10-PCS | Mod: S$GLB,,, | Performed by: NURSE PRACTITIONER

## 2023-02-27 PROCEDURE — 3077F SYST BP >= 140 MM HG: CPT | Mod: CPTII,S$GLB,, | Performed by: NURSE PRACTITIONER

## 2023-02-27 PROCEDURE — 1159F PR MEDICATION LIST DOCUMENTED IN MEDICAL RECORD: ICD-10-PCS | Mod: CPTII,S$GLB,, | Performed by: NURSE PRACTITIONER

## 2023-02-27 PROCEDURE — 3078F PR MOST RECENT DIASTOLIC BLOOD PRESSURE < 80 MM HG: ICD-10-PCS | Mod: CPTII,S$GLB,, | Performed by: NURSE PRACTITIONER

## 2023-02-27 PROCEDURE — 3077F PR MOST RECENT SYSTOLIC BLOOD PRESSURE >= 140 MM HG: ICD-10-PCS | Mod: CPTII,S$GLB,, | Performed by: NURSE PRACTITIONER

## 2023-02-27 PROCEDURE — 3078F DIAST BP <80 MM HG: CPT | Mod: CPTII,S$GLB,, | Performed by: NURSE PRACTITIONER

## 2023-02-27 PROCEDURE — 3008F BODY MASS INDEX DOCD: CPT | Mod: CPTII,S$GLB,, | Performed by: NURSE PRACTITIONER

## 2023-02-27 PROCEDURE — 1160F RVW MEDS BY RX/DR IN RCRD: CPT | Mod: CPTII,S$GLB,, | Performed by: NURSE PRACTITIONER

## 2023-02-27 RX ORDER — KETOROLAC TROMETHAMINE 30 MG/ML
30 INJECTION, SOLUTION INTRAMUSCULAR; INTRAVENOUS
Status: COMPLETED | OUTPATIENT
Start: 2023-02-27 | End: 2023-02-27

## 2023-02-27 RX ORDER — LIDOCAINE 50 MG/G
1 PATCH TOPICAL DAILY
Qty: 15 PATCH | Refills: 0 | Status: SHIPPED | OUTPATIENT
Start: 2023-02-27 | End: 2024-02-10

## 2023-02-27 RX ORDER — METHOCARBAMOL 500 MG/1
500 TABLET, FILM COATED ORAL NIGHTLY PRN
Qty: 30 TABLET | Refills: 0 | OUTPATIENT
Start: 2023-02-27 | End: 2023-11-05

## 2023-02-27 RX ORDER — NAPROXEN 500 MG/1
500 TABLET ORAL 2 TIMES DAILY WITH MEALS
Qty: 14 TABLET | Refills: 0 | Status: SHIPPED | OUTPATIENT
Start: 2023-02-27 | End: 2023-03-06

## 2023-02-27 RX ADMIN — KETOROLAC TROMETHAMINE 30 MG: 30 INJECTION, SOLUTION INTRAMUSCULAR; INTRAVENOUS at 12:02

## 2023-02-27 NOTE — PATIENT INSTRUCTIONS
- Follow up with your PCP or specialty clinic as directed in the next 1-2 weeks if not improved or as needed.  You can call [phone] to schedule an appointment with the appropriate provider.    - Go to the ER or seek medical attention immediately if you develop new or worsening symptoms.    - You must understand that you have received an Urgent Care treatment only and that you may be released before all of your medical problems are known or treated.   - You, the patient, will arrange for follow up care as instructed.   - If your condition worsens or fails to improve we recommend that you receive another evaluation at the ER immediately or contact your PCP to discuss your concerns or return here.     You were given a Toradol injection in clinic today, please refrain from any additional NSAIDs for the next 24 hours.

## 2023-02-27 NOTE — LETTER
February 27, 2023      [facility] - [facility]  [address] [facility], [address]  [place] [address]  Phone: [phone]  Fax: [phone]       Patient: Anamaria Connell   YOB: 1985  Date of Visit: 02/27/2023    To Whom It May Concern:    Batsheva Connell  was at [facility] on 02/27/2023. The patient may return to work/school on 2/28/2023. If you have any questions or concerns, or if I can be of further assistance, please do not hesitate to contact me.    Sincerely,    Chris Virk NP

## 2023-02-27 NOTE — PROGRESS NOTES
"Subjective:       Patient ID: Anamaria Connell is a 37 y.o. female.    Vitals:  height is 5' 5" (1.651 m) and weight is 142.9 kg (315 lb) (abnormal). Her tympanic temperature is 97.9 °F (36.6 °C). Her blood pressure is 177/79 (abnormal) and her pulse is 81. Her respiration is 18 and oxygen saturation is 95%.     Chief Complaint: Leg Pain    37-year-old female presents to clinic for evaluation of left lower back pain that radiates down left leg for the last 3 months.  She denies any trauma.  She denies any falls.  She reports pain is worse with walking, standing, lying.  She reports taking previously prescribed muscle relaxer with minimal relief.  She denies any urinary complaints.  She is awake and alert, answers questions appropriately, no acute distress noted on today's visit.    Leg Pain   The incident occurred more than 1 week ago. The incident occurred at home. There was no injury mechanism. The pain is present in the left hip and left leg. The quality of the pain is described as aching (sharp). The pain is at a severity of 8/10. The pain is moderate. The pain has been Constant since onset. Associated symptoms include an inability to bear weight. She reports no foreign bodies present. The symptoms are aggravated by movement and weight bearing. She has tried acetaminophen (muscle relaxer) for the symptoms.     Constitution: Negative for activity change, appetite change, chills, sweating, fatigue and fever.   Cardiovascular:  Negative for chest pain.   Respiratory:  Negative for cough and shortness of breath.    Musculoskeletal:  Positive for back pain. Negative for trauma.   Neurological:  Negative for dizziness.     Objective:      Physical Exam   Constitutional: She is oriented to person, place, and time. She appears well-developed. She is cooperative. No distress.   HENT:   Head: Normocephalic and atraumatic.   Nose: Nose normal.   Mouth/Throat: Oropharynx is clear and moist and mucous membranes are normal. "   Eyes: Lids are normal.   Neck: Trachea normal and phonation normal.   Cardiovascular: Normal rate, normal heart sounds and normal pulses.   Pulmonary/Chest: Effort normal and breath sounds normal. No respiratory distress.   Abdominal: Normal appearance.   Musculoskeletal:         General: Tenderness present. No swelling, deformity or signs of injury.      Thoracic back: Normal.      Lumbar back: She exhibits tenderness. She exhibits no swelling and no edema.        Back:    Neurological: She is alert and oriented to person, place, and time. She has normal strength and normal reflexes. No sensory deficit.   Skin: Skin is warm, dry, intact and not diaphoretic.   Psychiatric: Her speech is normal and behavior is normal. Mood, judgment and thought content normal.   Nursing note and vitals reviewed.      Assessment:       1. Chronic left-sided low back pain with left-sided sciatica          Plan:         Chronic left-sided low back pain with left-sided sciatica  -     ketorolac injection 30 mg  -     methocarbamoL (ROBAXIN) 500 MG Tab; Take 1 tablet (500 mg total) by mouth nightly as needed (muscle spasms).  Dispense: 30 tablet; Refill: 0  -     naproxen (NAPROSYN) 500 MG tablet; Take 1 tablet (500 mg total) by mouth 2 (two) times daily with meals. for 7 days  Dispense: 14 tablet; Refill: 0  -     LIDOcaine (LIDODERM) 5 %; Place 1 patch onto the skin once daily. Remove & Discard patch within 12 hours or as directed by MD  Dispense: 15 patch; Refill: 0    - discussed follow-up with PCP for evaluation of chronic back pain.     Patient Instructions   - Follow up with your PCP or specialty clinic as directed in the next 1-2 weeks if not improved or as needed.  You can call [phone] to schedule an appointment with the appropriate provider.    - Go to the ER or seek medical attention immediately if you develop new or worsening symptoms.    - You must understand that you have received an Urgent Care treatment only and  that you may be released before all of your medical problems are known or treated.   - You, the patient, will arrange for follow up care as instructed.   - If your condition worsens or fails to improve we recommend that you receive another evaluation at the ER immediately or contact your PCP to discuss your concerns or return here.     You were given a Toradol injection in clinic today, please refrain from any additional NSAIDs for the next 24 hours.

## 2023-04-22 ENCOUNTER — OFFICE VISIT (OUTPATIENT)
Dept: URGENT CARE | Facility: CLINIC | Age: 38
End: 2023-04-22
Payer: MEDICAID

## 2023-04-22 VITALS
SYSTOLIC BLOOD PRESSURE: 179 MMHG | OXYGEN SATURATION: 97 % | WEIGHT: 293 LBS | BODY MASS INDEX: 48.82 KG/M2 | TEMPERATURE: 99 F | HEIGHT: 65 IN | RESPIRATION RATE: 16 BRPM | HEART RATE: 87 BPM | DIASTOLIC BLOOD PRESSURE: 111 MMHG

## 2023-04-22 DIAGNOSIS — J06.9 VIRAL URI WITH COUGH: ICD-10-CM

## 2023-04-22 DIAGNOSIS — R05.9 COUGH, UNSPECIFIED TYPE: Primary | ICD-10-CM

## 2023-04-22 DIAGNOSIS — J02.9 SORE THROAT: ICD-10-CM

## 2023-04-22 LAB
CTP QC/QA: YES
CTP QC/QA: YES
MOLECULAR STREP A: NEGATIVE
SARS-COV-2 AG RESP QL IA.RAPID: NEGATIVE

## 2023-04-22 PROCEDURE — 87651 STREP A DNA AMP PROBE: CPT | Mod: QW,S$GLB,, | Performed by: NURSE PRACTITIONER

## 2023-04-22 PROCEDURE — 87811 SARS CORONAVIRUS 2 ANTIGEN POCT, MANUAL READ: ICD-10-PCS | Mod: QW,S$GLB,, | Performed by: NURSE PRACTITIONER

## 2023-04-22 PROCEDURE — 87811 SARS-COV-2 COVID19 W/OPTIC: CPT | Mod: QW,S$GLB,, | Performed by: NURSE PRACTITIONER

## 2023-04-22 PROCEDURE — 99213 OFFICE O/P EST LOW 20 MIN: CPT | Mod: S$GLB,,, | Performed by: NURSE PRACTITIONER

## 2023-04-22 PROCEDURE — 87651 POCT STREP A MOLECULAR: ICD-10-PCS | Mod: QW,S$GLB,, | Performed by: NURSE PRACTITIONER

## 2023-04-22 PROCEDURE — 99213 PR OFFICE/OUTPT VISIT, EST, LEVL III, 20-29 MIN: ICD-10-PCS | Mod: S$GLB,,, | Performed by: NURSE PRACTITIONER

## 2023-04-22 RX ORDER — FLUTICASONE PROPIONATE 50 MCG
1 SPRAY, SUSPENSION (ML) NASAL DAILY
Qty: 18.2 ML | Refills: 0 | Status: SHIPPED | OUTPATIENT
Start: 2023-04-22 | End: 2023-04-29

## 2023-04-22 RX ORDER — CETIRIZINE HYDROCHLORIDE 10 MG/1
10 TABLET ORAL DAILY
Qty: 30 TABLET | Refills: 0 | Status: SHIPPED | OUTPATIENT
Start: 2023-04-22 | End: 2023-05-22

## 2023-04-22 RX ORDER — BENZONATATE 200 MG/1
200 CAPSULE ORAL 3 TIMES DAILY PRN
Qty: 30 CAPSULE | Refills: 0 | Status: SHIPPED | OUTPATIENT
Start: 2023-04-22 | End: 2023-05-02

## 2023-04-22 NOTE — PROGRESS NOTES
"Subjective:      Patient ID: Anaamria Connell is a 37 y.o. female.    Vitals:  height is 5' 5" (1.651 m) and weight is 142.9 kg (315 lb) (abnormal). Her tympanic temperature is 98.8 °F (37.1 °C). Her blood pressure is 179/111 (abnormal) and her pulse is 87. Her respiration is 16 and oxygen saturation is 97%.     Chief Complaint: Cough    Pt is coming in with sore throat that started about two days ago. Pt also has cough and runny nose that started yesterday. Pt says she isn't sure if she was exposed to anything. Pt took some of the cough medication she was given from her last visit with mild relief.     Cough  This is a new problem. The current episode started in the past 7 days. The problem has been gradually worsening. The problem occurs every few minutes. The cough is Non-productive. Associated symptoms include headaches, nasal congestion and a sore throat. Pertinent negatives include no chest pain, fever or shortness of breath. Nothing aggravates the symptoms. She has tried prescription cough suppressant for the symptoms. The treatment provided mild relief. There is no history of asthma, bronchitis or pneumonia.     Constitution: Negative for sweating, fatigue and fever.   HENT:  Positive for congestion and sore throat.    Cardiovascular:  Negative for chest pain and sob on exertion.   Respiratory:  Positive for cough. Negative for shortness of breath.    Neurological:  Positive for headaches.    Objective:     Physical Exam   Constitutional: She is oriented to person, place, and time.   HENT:   Head: Normocephalic and atraumatic.   Cardiovascular: Normal rate and regular rhythm.   Pulmonary/Chest: Effort normal and breath sounds normal. No stridor. No respiratory distress. She has no wheezes. She has no rhonchi. She has no rales. She exhibits no tenderness.   Abdominal: Normal appearance.   Neurological: She is alert and oriented to person, place, and time.   Skin: Skin is warm and dry.   Psychiatric: Her behavior " is normal. Mood normal.       Results for orders placed or performed in visit on 04/22/23   SARS Coronavirus 2 Antigen, POCT Manual Read   Result Value Ref Range    SARS Coronavirus 2 Antigen Negative Negative     Acceptable Yes    POCT Strep A, Molecular   Result Value Ref Range    Molecular Strep A, POC Negative Negative     Acceptable Yes       Assessment:     1. Cough, unspecified type    2. Viral URI with cough    3. Sore throat        Plan:   COVID test negative   Strep test negative   Likely viral   Retest for COVID in 2 days if symptoms persist or worsen  Flonase, Zyrtec and Tessalon Perles        Cough, unspecified type  -     SARS Coronavirus 2 Antigen, POCT Manual Read  -     fluticasone propionate (FLONASE) 50 mcg/actuation nasal spray; 1 spray (50 mcg total) by Each Nostril route once daily. for 7 days  Dispense: 18.2 mL; Refill: 0  -     benzonatate (TESSALON) 200 MG capsule; Take 1 capsule (200 mg total) by mouth 3 (three) times daily as needed for Cough.  Dispense: 30 capsule; Refill: 0  -     cetirizine (ZYRTEC) 10 MG tablet; Take 1 tablet (10 mg total) by mouth once daily.  Dispense: 30 tablet; Refill: 0    Viral URI with cough    Sore throat  -     POCT Strep A, Molecular

## 2023-11-05 ENCOUNTER — HOSPITAL ENCOUNTER (EMERGENCY)
Facility: HOSPITAL | Age: 38
Discharge: HOME | End: 2023-11-05
Attending: EMERGENCY MEDICINE
Payer: MEDICAID

## 2023-11-05 VITALS
SYSTOLIC BLOOD PRESSURE: 116 MMHG | DIASTOLIC BLOOD PRESSURE: 87 MMHG | TEMPERATURE: 98 F | RESPIRATION RATE: 16 BRPM | OXYGEN SATURATION: 100 % | BODY MASS INDEX: 47.09 KG/M2 | WEIGHT: 293 LBS | HEIGHT: 66 IN | HEART RATE: 87 BPM

## 2023-11-05 DIAGNOSIS — M25.561 ARTHRALGIA OF BOTH KNEES: Primary | ICD-10-CM

## 2023-11-05 DIAGNOSIS — M25.562 ARTHRALGIA OF BOTH KNEES: Primary | ICD-10-CM

## 2023-11-05 PROBLEM — K21.00 GASTROESOPHAGEAL REFLUX DISEASE WITH ESOPHAGITIS: Status: ACTIVE | Noted: 2021-06-15

## 2023-11-05 PROBLEM — F41.8 DEPRESSION WITH ANXIETY: Status: ACTIVE | Noted: 2023-08-24

## 2023-11-05 PROCEDURE — 99284 EMERGENCY DEPT VISIT MOD MDM: CPT | Mod: ER

## 2023-11-05 PROCEDURE — 96372 THER/PROPH/DIAG INJ SC/IM: CPT | Performed by: EMERGENCY MEDICINE

## 2023-11-05 PROCEDURE — 63600175 PHARM REV CODE 636 W HCPCS: Mod: ER | Performed by: EMERGENCY MEDICINE

## 2023-11-05 RX ORDER — METHOCARBAMOL 500 MG/1
1000 TABLET, FILM COATED ORAL 3 TIMES DAILY
Qty: 30 TABLET | Refills: 0 | Status: SHIPPED | OUTPATIENT
Start: 2023-11-05 | End: 2023-11-10

## 2023-11-05 RX ORDER — ORPHENADRINE CITRATE 30 MG/ML
60 INJECTION INTRAMUSCULAR; INTRAVENOUS
Status: COMPLETED | OUTPATIENT
Start: 2023-11-05 | End: 2023-11-05

## 2023-11-05 RX ORDER — ACETAMINOPHEN 500 MG
500 TABLET ORAL EVERY 6 HOURS PRN
Qty: 30 TABLET | Refills: 0 | Status: SHIPPED | OUTPATIENT
Start: 2023-11-05 | End: 2024-02-10

## 2023-11-05 RX ORDER — DICLOFENAC SODIUM 10 MG/G
2 GEL TOPICAL 4 TIMES DAILY PRN
Qty: 200 G | Refills: 0 | Status: SHIPPED | OUTPATIENT
Start: 2023-11-05 | End: 2024-02-10

## 2023-11-05 RX ORDER — IBUPROFEN 600 MG/1
600 TABLET ORAL EVERY 6 HOURS PRN
Qty: 20 TABLET | Refills: 0 | Status: SHIPPED | OUTPATIENT
Start: 2023-11-05 | End: 2024-02-10

## 2023-11-05 RX ORDER — KETOROLAC TROMETHAMINE 30 MG/ML
30 INJECTION, SOLUTION INTRAMUSCULAR; INTRAVENOUS
Status: COMPLETED | OUTPATIENT
Start: 2023-11-05 | End: 2023-11-05

## 2023-11-05 RX ADMIN — KETOROLAC TROMETHAMINE 30 MG: 30 INJECTION, SOLUTION INTRAMUSCULAR; INTRAVENOUS at 01:11

## 2023-11-05 RX ADMIN — ORPHENADRINE CITRATE 60 MG: 60 INJECTION INTRAMUSCULAR; INTRAVENOUS at 01:11

## 2023-11-05 NOTE — Clinical Note
"Anamaria Mchugh" Becki was seen and treated in our emergency department on 11/5/2023.  She may return to work on 11/07/2023.       If you have any questions or concerns, please don't hesitate to call.      MB RN    "

## 2023-11-05 NOTE — ED PROVIDER NOTES
Encounter Date: 11/5/2023    SCRIBE #1 NOTE: I, Joi Haynes, am scribing for, and in the presence of,  Alexa Victor DO. I have scribed the following portions of the note - Other sections scribed: HPI, ROS, PE, MDM.   SCRIBE #2 NOTE: I, Ivonne Jim, am scribing for, and in the presence of,  Alexa Victor DO. I have scribed the remaining portions of the note not scribed by Scribe #1.     History     Chief Complaint   Patient presents with    Leg Pain     Right leg and knee pain x 3-4 days. Also reports mild left leg pain. Denies injury.      Anamaria Connell is a 38 y.o. female with Hx of Arthritis and HTN, who presents to the ED for chief complaint of worsening right knee and leg pain that began three days ago. Pt describes right leg pain as aches, stiffness, and shaking. Pt notes that she also has mild left leg pain.  Patient reports a history of arthritis in both knees.  Pt medicated with Naproxen (last dose this morning) and also takes her HTN daily medication. Pt not taking daily medication for her arthritis, but does use a muscle relaxer. Pt denies chest pain, SOB, nausea, vomiting, diarrhea. Pt denies tobacco use and recreational drug use, but admits to occasional EtOH consumption.    The history is provided by the patient. No  was used.     Review of patient's allergies indicates:  No Known Allergies  Past Medical History:   Diagnosis Date    Arthritis     Hypertension      Past Surgical History:   Procedure Laterality Date    HERNIA REPAIR      TUBAL LIGATION       No family history on file.  Social History     Tobacco Use    Smoking status: Never    Smokeless tobacco: Never   Substance Use Topics    Alcohol use: Yes     Comment: ocassionally    Drug use: No     Review of Systems   Constitutional:  Negative for fever.   HENT:  Negative for rhinorrhea and sore throat.    Eyes:  Negative for redness.   Respiratory:  Negative for shortness of breath.    Cardiovascular:  Negative for  chest pain and leg swelling.   Gastrointestinal:  Negative for abdominal pain, diarrhea, nausea and vomiting.   Musculoskeletal:  Positive for arthralgias (right knee and leg, mild left leg). Negative for back pain.   Skin:  Negative for rash.   Neurological:  Negative for syncope and headaches.   All other systems reviewed and are negative.      Physical Exam     Initial Vitals   BP Pulse Resp Temp SpO2   11/05/23 1249 11/05/23 1247 11/05/23 1247 11/05/23 1247 11/05/23 1247   (!) 125/91 101 20 98.4 °F (36.9 °C) 97 %      MAP       --                Physical Exam    Nursing note and vitals reviewed.  Constitutional: She appears well-developed and well-nourished.   Patient gave consent to have physical exam performed.    HENT:   Head: Normocephalic and atraumatic.   Right Ear: External ear normal.   Left Ear: External ear normal.   Nose: Nose normal.   Mouth/Throat: Oropharynx is clear and moist.   Eyes: Conjunctivae and EOM are normal. Pupils are equal, round, and reactive to light.   Neck: Phonation normal. Neck supple.   Normal range of motion.  Cardiovascular:  Normal rate, regular rhythm, normal heart sounds and intact distal pulses.     Exam reveals no gallop and no friction rub.       No murmur heard.  Pulmonary/Chest: Effort normal and breath sounds normal. No stridor. No respiratory distress. She has no wheezes. She has no rhonchi. She has no rales. She exhibits no tenderness.   Abdominal: Abdomen is soft. Bowel sounds are normal. She exhibits no distension. There is no abdominal tenderness. There is no rigidity, no rebound and no guarding.   Musculoskeletal:         General: No tenderness. Normal range of motion.      Cervical back: Normal range of motion and neck supple.      Comments:          Neurological: She is alert and oriented to person, place, and time. She has normal strength. No cranial nerve deficit or sensory deficit. GCS score is 15. GCS eye subscore is 4. GCS verbal subscore is 5. GCS motor  subscore is 6.   Skin: Skin is warm and dry. Capillary refill takes less than 2 seconds. No rash noted. No erythema.   Psychiatric: She has a normal mood and affect. Her behavior is normal.         ED Course   Procedures  Labs Reviewed - No data to display       Imaging Results    None          Medications   ketorolac injection 30 mg (has no administration in time range)   orphenadrine injection 60 mg (has no administration in time range)     Medical Decision Making  Risk  OTC drugs.  Prescription drug management.       Medical Decision Making:    This is an evaluation of a 38 y.o. female that presents to the Emergency Department for   Chief Complaint   Patient presents with    Leg Pain     Right leg and knee pain x 3-4 days. Also reports mild left leg pain. Denies injury.      The patient is a non-toxic and well appearing patient. On physical exam, patient appears well hydrated with moist mucus membranes. Breath sounds are clear and equal bilaterally with no adventitious breath sounds, tachypnea or respiratory distress. Regular rate and rhythm. No murmurs. Abdomen soft and non tender. Patient is tolerating PO without difficulty.  Vital Signs Are Reassuring.     Based on the patient's symptoms, I am considering and evaluating for the following differential diagnoses: Chronic pain, Arthritis, Arthralgias, Cellulitis, Insect bite, Abrasion.      ED Course:Treatment in the ED included Physical Exam and medications given in ED  Medications   ketorolac injection 30 mg (30 mg Intramuscular Given 11/5/23 1351)   orphenadrine injection 60 mg (60 mg Intramuscular Given 11/5/23 1351)   .   Patient reports feeling better after treatment in the ER.     External Data/Documents Reviewed:     Risk  Diagnosis or treatment significantly limited by the following social determinants of health: Body mass index is 50.52 kg/m².     In shared decision making with the patient, we discussed treatment, prescriptions, labs, and imaging  results.    Discharge home with   ED Prescriptions       Medication Sig Dispense Start Date End Date Auth. Provider    methocarbamoL (ROBAXIN) 500 MG Tab Take 2 tablets (1,000 mg total) by mouth 3 (three) times daily. for 5 days 30 tablet 11/5/2023 11/10/2023 Alexa Victor,     acetaminophen (TYLENOL) 500 MG tablet Take 1 tablet (500 mg total) by mouth every 6 (six) hours as needed for Pain (As needed for mild-to-moderate pain). 30 tablet 11/5/2023 -- Alexa Victor DO    diclofenac sodium (VOLTAREN) 1 % Gel Apply 2 g topically 4 (four) times daily as needed (Apply to painful area 4 times a day as needed for pain). 200 g 11/5/2023 -- Alexa Victor DO    ibuprofen (ADVIL,MOTRIN) 600 MG tablet Take 1 tablet (600 mg total) by mouth every 6 (six) hours as needed for Pain (Take with food as needed for mild-to-moderate pain). 20 tablet 11/5/2023 -- Alexa Victor DO          Fill and take prescriptions as directed.  Return to the ED if symptoms worsen or do not resolve.   Answered questions and discussed discharge plan.    Patient feels better and is ready for discharge.  Follow up with PCP/specialist in 1 day    The following labs and imaging were reviewed:    No visits with results within 1 Day(s) from this visit.   Latest known visit with results is:   Office Visit on 04/22/2023   Component Date Value Ref Range Status    SARS Coronavirus 2 Antigen 04/22/2023 Negative  Negative Final     Acceptable 04/22/2023 Yes   Final    Molecular Strep A, POC 04/22/2023 Negative  Negative Final     Acceptable 04/22/2023 Yes   Final        Imaging Results    None           Scribe Attestation:   Scribe #1: I performed the above scribed service and the documentation accurately describes the services I performed. I attest to the accuracy of the note.  Scribe #2: I performed the above scribed service and the documentation accurately describes the services I performed. I attest to the accuracy of the note.                      I, Dr. Alexa Victor, personally performed the services described in this documentation. This document was produced by a scribe under my direction and in my presence. All medical record entries made by the scribe were at my direction and in my presence.  I have reviewed the chart and agree that the record reflects my personal performance and is accurate and complete. Alexa Victor DO.     11/05/2023 7:22 PM      Clinical Impression:   Final diagnoses:  [M25.561, M25.562] Arthralgia of both knees (Primary)               Alexa Victor DO  11/05/23 1922

## 2023-11-05 NOTE — Clinical Note
"Anamaria "Anamaria" Becki was seen and treated in our emergency department on 11/5/2023.  She may return to work on 11/06/2023.       If you have any questions or concerns, please don't hesitate to call.      Alexa Victor, DO"

## 2023-11-14 ENCOUNTER — HOSPITAL ENCOUNTER (EMERGENCY)
Facility: HOSPITAL | Age: 38
Discharge: HOME | End: 2023-11-15
Attending: STUDENT IN AN ORGANIZED HEALTH CARE EDUCATION/TRAINING PROGRAM
Payer: MEDICAID

## 2023-11-14 VITALS
WEIGHT: 293 LBS | SYSTOLIC BLOOD PRESSURE: 146 MMHG | TEMPERATURE: 98 F | DIASTOLIC BLOOD PRESSURE: 86 MMHG | HEART RATE: 102 BPM | RESPIRATION RATE: 18 BRPM | BODY MASS INDEX: 50.84 KG/M2

## 2023-11-14 DIAGNOSIS — K08.89 PAIN, DENTAL: Primary | ICD-10-CM

## 2023-11-14 PROCEDURE — 25000003 PHARM REV CODE 250: Performed by: PHYSICIAN ASSISTANT

## 2023-11-14 PROCEDURE — 99283 EMERGENCY DEPT VISIT LOW MDM: CPT

## 2023-11-14 RX ORDER — AMOXICILLIN AND CLAVULANATE POTASSIUM 875; 125 MG/1; MG/1
1 TABLET, FILM COATED ORAL 2 TIMES DAILY
Qty: 14 TABLET | Refills: 0 | Status: SHIPPED | OUTPATIENT
Start: 2023-11-14 | End: 2023-11-21

## 2023-11-14 RX ORDER — OXYCODONE AND ACETAMINOPHEN 5; 325 MG/1; MG/1
1 TABLET ORAL
Status: COMPLETED | OUTPATIENT
Start: 2023-11-14 | End: 2023-11-14

## 2023-11-14 RX ORDER — CHLORHEXIDINE GLUCONATE ORAL RINSE 1.2 MG/ML
15 SOLUTION DENTAL 2 TIMES DAILY
Qty: 300 ML | Refills: 0 | Status: SHIPPED | OUTPATIENT
Start: 2023-11-14 | End: 2023-11-24

## 2023-11-14 RX ADMIN — OXYCODONE AND ACETAMINOPHEN 1 TABLET: 5; 325 TABLET ORAL at 11:11

## 2023-11-15 NOTE — DISCHARGE INSTRUCTIONS
Ibuprofen or Tylenol as needed for pain.  Peridex mouthwash twice daily.  Take antibiotics as prescribed.  Follow-up and establish care with a local dentist using information provided.    Return to this ED if you experience worsening pain despite treatment, if you develop fever, if you begin with neck stiffness, if you begin with oral swelling, if unable to open your mouth, if unable to eat or drink, if any other problems occur.

## 2023-11-15 NOTE — ED TRIAGE NOTES
37 yo female presents to the ED with c/o toothache. Pt is AAO x 4 upon assessment; reports that she has been experiencing dental pain for some weeks now but explains that the pain has worsened since yesterday. Pt denies any other symptoms at this time. NAD noted at this time. Plan of care is ongoing.

## 2023-11-15 NOTE — ED PROVIDER NOTES
Encounter Date: 11/14/2023       History     Chief Complaint   Patient presents with    Dental Pain     Pt presents to ER with c/o left side dental pain from a cracked tooth. Pt states pain goes to her left ear and down the left side of her neck. Pt rates pain 10/10. Pt states she took ibuprofen but has had no relief. Pt states the pain started yesterday.      38-year-old female presents to ED complaining of proximally 2 week history of left-sided dental pain, left-sided otalgia.    Symptoms began together, mostly to the left upper most posterior molar, and at the same time pain to the left postauricular region.  There is no real otalgia.  There is no facial swelling.  No trauma.  No rash.  No neck stiffness.  No obvious lymphadenopathy.  There is some discomfort with jaw range of motion.  No obvious oral swelling.  No fever chills. Does admit to history of frequent sinus issues, none recently.  Denies change in hearing.  Denies otorrhea.  Denies frequent ear infections.  Symptoms are acute, constant, mild to moderate.  No relief with unknown pain medication taken earlier today.  Plans to follow-up with dentist tomorrow.      PMH:  Obesity  Vitamin-D deficiency   Elevated PTH hormone   Depression  Anxiety  CONNOR   History of migraines  GERD   Chronic back pain   Anemia   Hypertension  Hyperlipidemia       Review of patient's allergies indicates:  No Known Allergies  Past Medical History:   Diagnosis Date    Arthritis     Hypertension      Past Surgical History:   Procedure Laterality Date    HERNIA REPAIR      TUBAL LIGATION       No family history on file.  Social History     Tobacco Use    Smoking status: Never    Smokeless tobacco: Never   Substance Use Topics    Alcohol use: Yes     Comment: ocassionally    Drug use: No     Review of Systems   Constitutional:  Negative for appetite change, chills and fever.   HENT:  Positive for dental problem and ear pain. Negative for ear discharge, facial swelling, hearing  loss, sinus pain and sore throat.    Musculoskeletal:  Negative for neck pain.   Skin:  Negative for rash.   Hematological:  Negative for adenopathy.       Physical Exam     Initial Vitals [11/14/23 2238]   BP Pulse Resp Temp SpO2   (!) 146/86 102 18 98.1 °F (36.7 °C) --      MAP       --         Physical Exam    Nursing note and vitals reviewed.  Constitutional: She appears well-developed and well-nourished. She is not diaphoretic. No distress.   HENT:   Head: Normocephalic and atraumatic.   Tooth #16 cracked, partially eroded, angled eruption, ttp, no periauricular swelling.  No intraoral edema.  Jaw full range of motion without discomfort or difficulty.  No associated adjacent facial swelling or rash.     No significant mastoid swelling or tenderness.  There is mild discomfort with palpation to the inferior and preauricular region without obvious lymphadenopathy or swelling.  No pain with manipulation of the ear, no pain with palpation of the tragus.  Left ear canal within normal limits, left TM shiny, no significant bulging, multiple air-fluid levels with clear fluid, possible scarring.  Similar appearance to the right TM although not as pronounced or severe.   Neck: Neck supple.   Normal range of motion.  Musculoskeletal:         General: Normal range of motion.      Cervical back: Normal range of motion and neck supple.     Lymphadenopathy:     She has no cervical adenopathy.   Neurological: She is alert and oriented to person, place, and time.   Skin: Skin is warm.         ED Course   Procedures  Labs Reviewed - No data to display       Imaging Results    None          Medications   oxyCODONE-acetaminophen 5-325 mg per tablet 1 tablet (1 tablet Oral Given 11/14/23 6384)     Medical Decision Making  Differential diagnosis:  Odontalgia, dental abscess, parotitis, mastoiditis, otitis media, otitis externa    Amount and/or Complexity of Data Reviewed  Discussion of management or test interpretation with external  provider(s): No convincing evidence of drainable collection.  No evidence of Harsh's.  No true otalgia, bilateral TMs and ear canals within normal limits.  No recent sinus issues.  Denies frequent ear infections.  No mastoid swelling or tenderness.  No convincing evidence of parotitis.  Neck is supple.  No cervical adenopathy.  Suspect most of her symptoms are related to cracked/eroded tooth, associated otalgia.  Reassuring vitals.  Symptoms times nearly 2 weeks.  Advised follow-up with dentist as planned.  Augmentin given worsening symptoms, no definite dental follow-up.  Advised Tylenol/ibuprofen as needed for discomfort.  Peridex b.i.d..  Return precautions given.    Risk  Prescription drug management.                               Clinical Impression:   Final diagnoses:  [K08.89] Pain, dental (Primary)        ED Disposition Condition    Discharge Stable          ED Prescriptions       Medication Sig Dispense Start Date End Date Auth. Provider    amoxicillin-clavulanate 875-125mg (AUGMENTIN) 875-125 mg per tablet Take 1 tablet by mouth 2 (two) times daily. for 7 days 14 tablet 11/14/2023 11/21/2023 Jayesh Garza PA-C    chlorhexidine (PERIDEX) 0.12 % solution Use as directed 15 mLs in the mouth or throat 2 (two) times daily. for 10 days 300 mL 11/14/2023 11/24/2023 Jayesh Garza PA-C          Follow-up Information       Follow up With Specialties Details Why Contact [facility] - [facility] Dental General Practice, Oral and Maxillofacial Surgery, Oral Surgery Schedule an appointment as soon as possible for a visit  For reevaluation [address] [place] [id number]  [phone]               Jayesh Garza PA-C  11/15/23 0158

## 2023-12-04 ENCOUNTER — HOSPITAL ENCOUNTER (EMERGENCY)
Facility: HOSPITAL | Age: 38
Discharge: HOME | End: 2023-12-04
Attending: EMERGENCY MEDICINE
Payer: MEDICAID

## 2023-12-04 VITALS
DIASTOLIC BLOOD PRESSURE: 111 MMHG | TEMPERATURE: 98 F | BODY MASS INDEX: 47.09 KG/M2 | HEIGHT: 66 IN | WEIGHT: 293 LBS | RESPIRATION RATE: 18 BRPM | HEART RATE: 81 BPM | OXYGEN SATURATION: 99 % | SYSTOLIC BLOOD PRESSURE: 174 MMHG

## 2023-12-04 DIAGNOSIS — Z86.69 HISTORY OF PAPILLEDEMA: ICD-10-CM

## 2023-12-04 DIAGNOSIS — S05.01XA ABRASION OF RIGHT CORNEA, INITIAL ENCOUNTER: Primary | ICD-10-CM

## 2023-12-04 DIAGNOSIS — Z86.79 HISTORY OF CHRONIC HYPERTENSION: ICD-10-CM

## 2023-12-04 PROBLEM — Z90.710 S/P LAPAROSCOPIC HYSTERECTOMY: Status: ACTIVE | Noted: 2022-11-03

## 2023-12-04 PROCEDURE — 99283 EMERGENCY DEPT VISIT LOW MDM: CPT

## 2023-12-04 PROCEDURE — 25000003 PHARM REV CODE 250: Performed by: PHYSICIAN ASSISTANT

## 2023-12-04 RX ORDER — PROPARACAINE HYDROCHLORIDE 5 MG/ML
1 SOLUTION/ DROPS OPHTHALMIC
Status: COMPLETED | OUTPATIENT
Start: 2023-12-04 | End: 2023-12-04

## 2023-12-04 RX ORDER — ERYTHROMYCIN 5 MG/G
OINTMENT OPHTHALMIC
Qty: 1 G | Refills: 0 | Status: SHIPPED | OUTPATIENT
Start: 2023-12-04 | End: 2024-02-10

## 2023-12-04 RX ADMIN — PROPARACAINE HYDROCHLORIDE 1 DROP: 5 SOLUTION/ DROPS OPHTHALMIC at 04:12

## 2023-12-04 RX ADMIN — FLUORESCEIN SODIUM 1 EACH: 1 STRIP OPHTHALMIC at 04:12

## 2023-12-04 NOTE — ED PROVIDER NOTES
Encounter Date: 12/4/2023    SCRIBE #1 NOTE: I, Joi Haynes, am scribing for, and in the presence of,  Noel Lovett PA-C. I have scribed the following portions of the note - Other sections scribed: HPI, ROS.       History     Chief Complaint   Patient presents with    Eye Problem     Pt c/o eye pain with irritation x1 day. Pt stated she was instructed to be evaluated in ED for papilledema and to get a repeat MRI, and referral to neurology.      Anamaria Connell is a 38 y.o. female with Hx of papilledema and HTN who presents to the ED complaining of worsening acute on chronic gradual onset eye irritation since yesterday. Pt notes her right eye is more irritated and itchy than her left. Pt reports right sided photophobia, blurry vision in right eye and normal vision in left eye. Denies pain, including for eye pain and HA. Pt also notes nausea and dizziness (resolved) for two days, but denies any emesis. Pt took Ibuprofen with minimal relief. Pt also notes taking medication for her blood pressure and an unknown muscle relaxer. Pt reports previous eye irritation is often accompanied by headaches, but denies any headaches with this episode. She has wondering if she may have migraines but has never been evaluated by neurology. Per chart review,  patient had an MRI on 9/24/21 after a referral from an outside optometrist stated the patient had papilledema.MRI results were normal aside from partially empty sella. She never followed up with ophthalmology or neurology. Pt last saw optometrist on 11/21/23 for routine glasses exam. Was evaluated by PCP today who expressed concern for worsening papilledema and bacterial conjunctivitis and advised to come to ED.     The history is provided by medical records and the patient. No  was used.     Review of patient's allergies indicates:  No Known Allergies  Past Medical History:   Diagnosis Date    Arthritis     Hypertension      Past Surgical History:    Procedure Laterality Date    HERNIA REPAIR      TUBAL LIGATION       History reviewed. No pertinent family history.  Social History     Tobacco Use    Smoking status: Never    Smokeless tobacco: Never   Substance Use Topics    Alcohol use: Yes     Comment: ocassionally    Drug use: No     Review of Systems   Constitutional:  Negative for fever.   HENT:  Negative for sore throat.    Eyes:  Positive for photophobia, itching and visual disturbance (R sided blurry vision). Negative for pain.   Respiratory:  Negative for shortness of breath.    Cardiovascular:  Negative for chest pain.   Gastrointestinal:  Positive for nausea. Negative for abdominal pain and vomiting.   Genitourinary:  Negative for difficulty urinating.   Musculoskeletal:  Negative for back pain.   Skin:  Negative for rash.   Neurological:  Positive for dizziness (resolved). Negative for seizures, weakness and headaches.   All other systems reviewed and are negative.      Physical Exam     Initial Vitals [12/04/23 1604]   BP Pulse Resp Temp SpO2   (!) 156/96 83 18 98.4 °F (36.9 °C) 97 %      MAP       --         Physical Exam    Nursing note and vitals reviewed.  Constitutional: She is not diaphoretic. No distress.   BMI = 51.3   HENT:   Head: Atraumatic.   Right Ear: External ear normal.   Left Ear: External ear normal.   Eyes: EOM are normal.   Visual Acuity:  R 20/30; L 20/25; Both 20/25.   IOP = R 16; L 13.     No contact lenses noted. Superficial corneal abrasion on the right with no fluorescein uptake on the left. Foreign body absent. Clear tearing present without purulent drainage. Conjunctival injection present on the right. No ciliary flush. Pupils equal and reactive to light with direct and consensual light reflexes; Very mild photophobia only on the R with direct. Negative Dez's sign with fluorescein staining. No dendritic lesion. No hyphema or subconjunctival hemorrhage. No cells or flares in the anterior chamber.    No periorbital  ecchymosis, lacerations, abrasions, swelling, warmth, erythema, or tenderness to palpation. Able to fully open eyelids with extraocular movements intact in all directions.     Neck: No tracheal deviation present.   Normal range of motion.  Cardiovascular:  Normal rate and regular rhythm.           Pulmonary/Chest: No accessory muscle usage or stridor. No tachypnea. No respiratory distress.   Musculoskeletal:         General: No edema. Normal range of motion.      Cervical back: Normal range of motion.     Neurological: She is alert and oriented to person, place, and time. She has normal strength. She displays no tremor. She displays no seizure activity. Coordination and gait normal.   Skin: Skin is intact. Capillary refill takes less than 2 seconds. No rash noted. No erythema.         ED Course   Procedures  Labs Reviewed - No data to display       Imaging Results    None          Medications   fluorescein ophthalmic strip 1 each (1 each Left Eye Given 12/4/23 1639)   proparacaine 0.5 % ophthalmic solution 1 drop (1 drop Left Eye Given 12/4/23 1639)     Medical Decision Making  Questionable papilledema since 2021 per record review.  MRI of the brain from  2021 shows empty sella with no other acute findings.  Opening pressure has never been performed.  Has never been evaluated by Ophthalmology. Never evaluated by neurology. Concerns of questionable papilledema by Optometry 13 days ago.  Currently she is having no pain but only irritation of the eyes; primarily on the right.  Denies headache.  No nausea or vomiting.      Eye irritation has completely resolved after topical proparacaine and she feels her vision has significantly improved immediately after proparacaine.  No gross vision loss or visual field deficits.  Small corneal abrasion that is likely the etiology of her irritation.  No signs of bacterial conjunctivitis as previously diagnosed and her conjunctival irritation likely due to abrasion.  Normal  intra-ocular pressure. Eye exam otherwise unremarkable.    Given outside facilities concern for worsening papilledema, case is reviewed with Ophthalmology; Dr. Cavanaugh.  Given chronicity of symptoms, no gross vision loss, no pain, and no deficits, patient will be a candidate for outpatient management.  Patient understands that she is to call Ophthalmology triage tomorrow morning for further guidance. Referrals have also been placed.  She will benefit from a dilated funduscopy exam with Ophthalmology.  If she ends up needing a lumbar puncture for opening pressure, this will be best performed by Interventional Radiology due to body habitus.  Patient is agreeable to this plan and would also prefer to avoid MRI and LP today.  Very strict return precautions have been discussed with patient who is agreeable to the plan.    Risk  Prescription drug management.            Scribe Attestation:   Scribe #1: I performed the above scribed service and the documentation accurately describes the services I performed. I attest to the accuracy of the note.                           I, Noel Lovett PA-C, personally performed the services described in this documentation. All medical record entries made by the scribe were at my direction and in my presence. I have reviewed the chart and agree that the record reflects my personal performance and is accurate and complete.      Clinical Impression:  Final diagnoses:  [S05.01XA] Abrasion of right cornea, initial encounter (Primary)  [Z86.69] History of papilledema  [Z86.79] History of chronic hypertension          ED Disposition Condition    Discharge Stable          ED Prescriptions       Medication Sig Dispense Start Date End Date Auth. Provider    erythromycin (ROMYCIN) ophthalmic ointment Place a 1/2 inch ribbon of ointment into the lower eyelid 2-3 times daily. 1 g 12/4/2023 -- Noel Lovett PA-C          Follow-up Information       Follow up With Specialties Details Why Contact  Info    [facility]  Schedule an appointment as soon as possible for a visit in 1 day For further evaluation, For more definitive management of your symptoms BOOK APPOINTMENT:  [phone]  [email or web address]      [facility]  [address] [facility] [address] [facility] [place] [address].  [phone].    LOCATION 2:   [address] [place] [address].  [phone].    [facility] - Emergency Dept Emergency Medicine Go to  If symptoms worsen or new symptoms develop [facility] [place] [address] [facility] [place] [id number] [phone]             Noel Lovett PA-C  12/04/23 1906

## 2023-12-05 NOTE — DISCHARGE INSTRUCTIONS
Thank you for coming to our Emergency Department today. It is important to remember that some problems or medical conditions are difficult to diagnose and may not be found or addressed during your Emergency Department visit.  These conditions often start with non-specific symptoms and can only be diagnosed on follow up visits with your primary care physician or specialist when the symptoms continue or change. Please remember that all medical conditions can change, and we cannot predict how you will be feeling tomorrow or the next day. Return to the ER with any questions/concerns, new/concerning symptoms, worsening or failure to improve.       Be sure to follow up with your primary care doctor and review all labs/imaging/tests that were performed during your ER visit with them. It is very common for us to identify non-emergent incidental findings which must be followed up with your primary care physician.  Some labs/imaging/tests may be outside of the normal range, and require non-emergent follow-up and/or further investigation/treatment/procedures/testing to help diagnose/exclude/prevent complications or other potentially serious medical conditions. Some abnormalities may not have been discussed or addressed during your ER visit.     An ER visit does not replace a primary care visit, and many screening tests or follow-up tests cannot be ordered by an ER doctor or performed by the ER. Some tests may even require pre-approval.    If you do not have a primary care doctor, you may contact the one listed on your discharge paperwork or you may also call the [facility] Appointment Desk at [phone] , or [address] [place] [address] at  [phone] to schedule an appointment, or establish care with a primary care doctor or even a specialist and to obtain information about local resources. It is important to your health that you have a primary care doctor.    Please take all medications as directed. We have done our best to select  a medication for you that will treat your condition however, all medications may potentially have side-effects and it is impossible to predict which medications may give you side-effects or what those side-effects (if any) those medications may give you.  If you feel that you are having a negative effect or side-effect of any medication you should stop taking those medications immediately and seek medical attention. If you feel that you are having a life-threatening reaction call 911.        Do not drive, swim, climb to height, take a bath, operate heavy machinery, drink alcohol or take potentially sedating medications, sign any legal documents or make any important decisions for 24 hours if you have received any pain medications, sedatives or mood altering drugs during your ER visit or within 24 hours of taking them if they have been prescribed to you.     You can find additional resources for Dentists, hearing aids, durable medical equipment, low cost pharmacies and other resources at [email or web address]

## 2023-12-07 ENCOUNTER — CLINICAL SUPPORT (OUTPATIENT)
Dept: OPHTHALMOLOGY | Facility: CLINIC | Age: 38
End: 2023-12-07
Payer: MEDICAID

## 2023-12-07 ENCOUNTER — OFFICE VISIT (OUTPATIENT)
Dept: OPHTHALMOLOGY | Facility: CLINIC | Age: 38
End: 2023-12-07
Payer: MEDICAID

## 2023-12-07 DIAGNOSIS — H47.333 CROWDED OPTIC DISC, BILATERAL: ICD-10-CM

## 2023-12-07 DIAGNOSIS — H47.333 PSEUDOPAPILLEDEMA OF BOTH OPTIC DISCS: Primary | ICD-10-CM

## 2023-12-07 PROCEDURE — 1160F PR REVIEW ALL MEDS BY PRESCRIBER/CLIN PHARMACIST DOCUMENTED: ICD-10-PCS | Mod: CPTII,,, | Performed by: OPHTHALMOLOGY

## 2023-12-07 PROCEDURE — 92083 EXTENDED VISUAL FIELD XM: CPT | Mod: PBBFAC | Performed by: OPHTHALMOLOGY

## 2023-12-07 PROCEDURE — 92083 HUMPHREY VISUAL FIELD - OU - BOTH EYES: ICD-10-PCS | Mod: 26,S$PBB,, | Performed by: OPHTHALMOLOGY

## 2023-12-07 PROCEDURE — 1159F PR MEDICATION LIST DOCUMENTED IN MEDICAL RECORD: ICD-10-PCS | Mod: CPTII,,, | Performed by: OPHTHALMOLOGY

## 2023-12-07 PROCEDURE — 99204 OFFICE O/P NEW MOD 45 MIN: CPT | Mod: S$PBB,,, | Performed by: OPHTHALMOLOGY

## 2023-12-07 PROCEDURE — 99213 OFFICE O/P EST LOW 20 MIN: CPT | Mod: PBBFAC | Performed by: OPHTHALMOLOGY

## 2023-12-07 PROCEDURE — 99999 PR PBB SHADOW E&M-EST. PATIENT-LVL III: ICD-10-PCS | Mod: PBBFAC,,, | Performed by: OPHTHALMOLOGY

## 2023-12-07 PROCEDURE — 3044F HG A1C LEVEL LT 7.0%: CPT | Mod: CPTII,,, | Performed by: OPHTHALMOLOGY

## 2023-12-07 PROCEDURE — 3044F PR MOST RECENT HEMOGLOBIN A1C LEVEL <7.0%: ICD-10-PCS | Mod: CPTII,,, | Performed by: OPHTHALMOLOGY

## 2023-12-07 PROCEDURE — 99999 PR PBB SHADOW E&M-EST. PATIENT-LVL III: CPT | Mod: PBBFAC,,, | Performed by: OPHTHALMOLOGY

## 2023-12-07 PROCEDURE — 99204 PR OFFICE/OUTPT VISIT, NEW, LEVL IV, 45-59 MIN: ICD-10-PCS | Mod: S$PBB,,, | Performed by: OPHTHALMOLOGY

## 2023-12-07 PROCEDURE — 1159F MED LIST DOCD IN RCRD: CPT | Mod: CPTII,,, | Performed by: OPHTHALMOLOGY

## 2023-12-07 PROCEDURE — 1160F RVW MEDS BY RX/DR IN RCRD: CPT | Mod: CPTII,,, | Performed by: OPHTHALMOLOGY

## 2023-12-07 RX ORDER — DULOXETIN HYDROCHLORIDE 60 MG/1
1 CAPSULE, DELAYED RELEASE ORAL DAILY
COMMUNITY
Start: 2023-11-28

## 2023-12-07 RX ORDER — HYDROXYZINE PAMOATE 25 MG/1
CAPSULE ORAL
COMMUNITY
Start: 2023-10-17

## 2023-12-07 RX ORDER — BISOPROLOL FUMARATE 5 MG/1
1 TABLET, FILM COATED ORAL DAILY
COMMUNITY
Start: 2023-11-28

## 2023-12-07 NOTE — PROGRESS NOTES
HPI    Referred by [facility]-  Patient here for follow up for Papilledema.  Vision blurry mainly at near. Glare problems.  No eye pain, but some irritation.    I have personally interviewed the patient, reviewed the history and   examined the patient and agree with the technician's exam.    Review HVF    I have personally interviewed the patient, reviewed the history and   examined the patient and agree with the technician's exam.  Last edited by Rinku Hadley MD on 12/7/2023  3:53 PM.            Assessment /Plan     For exam results, see Encounter Report.    Pseudopapilledema of both optic discs  -     Flores Visual Field - OU - Extended - Both Eyes; Future    Crowded optic disc, bilateral      The optic discs are not edematous but are crowded into a pattern of pseudopapilledema. She will return as needed..

## 2023-12-07 NOTE — PATIENT INSTRUCTIONS
The optic discs are not edematous but are crowded into a pattern of pseudopapilledema. She will return as needed

## 2024-02-10 ENCOUNTER — HOSPITAL ENCOUNTER (EMERGENCY)
Facility: HOSPITAL | Age: 39
Discharge: HOME | End: 2024-02-10
Attending: INTERNAL MEDICINE
Payer: MEDICAID

## 2024-02-10 VITALS
BODY MASS INDEX: 48.82 KG/M2 | WEIGHT: 293 LBS | TEMPERATURE: 98 F | OXYGEN SATURATION: 97 % | DIASTOLIC BLOOD PRESSURE: 105 MMHG | RESPIRATION RATE: 16 BRPM | HEIGHT: 65 IN | HEART RATE: 80 BPM | SYSTOLIC BLOOD PRESSURE: 168 MMHG

## 2024-02-10 DIAGNOSIS — M54.32 SCIATICA OF LEFT SIDE: Primary | ICD-10-CM

## 2024-02-10 PROCEDURE — 96372 THER/PROPH/DIAG INJ SC/IM: CPT | Performed by: INTERNAL MEDICINE

## 2024-02-10 PROCEDURE — 99284 EMERGENCY DEPT VISIT MOD MDM: CPT | Mod: 25,ER

## 2024-02-10 PROCEDURE — 63600175 PHARM REV CODE 636 W HCPCS: Mod: ER | Performed by: INTERNAL MEDICINE

## 2024-02-10 RX ORDER — IBUPROFEN 800 MG/1
800 TABLET ORAL EVERY 8 HOURS PRN
Qty: 30 TABLET | Refills: 0 | Status: SHIPPED | OUTPATIENT
Start: 2024-02-10 | End: 2024-04-28

## 2024-02-10 RX ORDER — DEXAMETHASONE SODIUM PHOSPHATE 4 MG/ML
8 INJECTION, SOLUTION INTRA-ARTICULAR; INTRALESIONAL; INTRAMUSCULAR; INTRAVENOUS; SOFT TISSUE
Status: COMPLETED | OUTPATIENT
Start: 2024-02-10 | End: 2024-02-10

## 2024-02-10 RX ORDER — KETOROLAC TROMETHAMINE 30 MG/ML
60 INJECTION, SOLUTION INTRAMUSCULAR; INTRAVENOUS
Status: COMPLETED | OUTPATIENT
Start: 2024-02-10 | End: 2024-02-10

## 2024-02-10 RX ORDER — GABAPENTIN 300 MG/1
300 CAPSULE ORAL NIGHTLY
Qty: 30 CAPSULE | Refills: 0 | Status: SHIPPED | OUTPATIENT
Start: 2024-02-10 | End: 2024-03-11

## 2024-02-10 RX ADMIN — DEXAMETHASONE SODIUM PHOSPHATE 8 MG: 4 INJECTION, SOLUTION INTRA-ARTICULAR; INTRALESIONAL; INTRAMUSCULAR; INTRAVENOUS; SOFT TISSUE at 12:02

## 2024-02-10 RX ADMIN — KETOROLAC TROMETHAMINE 60 MG: 30 INJECTION, SOLUTION INTRAMUSCULAR; INTRAVENOUS at 12:02

## 2024-02-10 NOTE — Clinical Note
"Anamaria Mchugh" Becki was seen and treated in our emergency department on 2/10/2024.  She may return to work on 02/11/2024.       If you have any questions or concerns, please don't hesitate to call.       RN    "

## 2024-02-10 NOTE — ED PROVIDER NOTES
Encounter Date: 2/10/2024       History     Chief Complaint   Patient presents with    Leg Pain     Bilateral knee and thigh pain for months. Worse when just starting to move/get up. Denies fall or injury. Was trying amitriptyline but has not helped pain.     38-year-old female presents to the emergency department complaining of left buttock and lower extremity pain for the past 2 days.  Patient states she has been previously diagnosed with sciatica and has similar symptoms today.  She denies fever/chills/nausea/vomiting/chest pain/shortness of breath.  She also denies recent trauma.    The history is provided by the patient. No  was used.     Review of patient's allergies indicates:  No Known Allergies  Past Medical History:   Diagnosis Date    Arthritis     Hypertension      Past Surgical History:   Procedure Laterality Date    HERNIA REPAIR      TUBAL LIGATION       No family history on file.  Social History     Tobacco Use    Smoking status: Never    Smokeless tobacco: Never   Substance Use Topics    Alcohol use: Yes     Comment: ocassionally    Drug use: No     Review of Systems   Constitutional:  Negative for chills and fever.   Respiratory:  Negative for shortness of breath.    Cardiovascular:  Negative for chest pain.   Gastrointestinal:  Negative for abdominal pain, diarrhea, nausea and vomiting.   Musculoskeletal:         Left lower extremity pain   Neurological:  Negative for dizziness, weakness and numbness.   All other systems reviewed and are negative.      Physical Exam     Initial Vitals [02/10/24 0006]   BP Pulse Resp Temp SpO2   (!) 168/105 80 16 98.2 °F (36.8 °C) 97 %      MAP       --         Physical Exam    Nursing note and vitals reviewed.  Constitutional: She is not diaphoretic. No distress.   Obese   HENT:   Head: Normocephalic and atraumatic.   Eyes: Conjunctivae are normal.   Neck:   Normal range of motion.  Cardiovascular:  Normal rate and regular rhythm.            Pulmonary/Chest: Breath sounds normal.   Abdominal: Abdomen is soft. Bowel sounds are normal.   Musculoskeletal:      Cervical back: Normal range of motion.      Comments: Left lower extremity pain upon movement and ambulation without tenderness to palpation.  Bilateral lower extremities are neurovascularly intact.     Neurological: She is alert. She has normal strength and normal reflexes.   Skin: Skin is warm and dry.   Psychiatric: She has a normal mood and affect.         ED Course   Procedures  Labs Reviewed - No data to display       Imaging Results    None          Medications   ketorolac injection 60 mg (has no administration in time range)   dexAMETHasone injection 8 mg (has no administration in time range)     Medical Decision Making  38-year-old female presents to the emergency department complaining of left buttock and lower extremity pain for the past 2 days.  Patient states she has been previously diagnosed with sciatica and has similar symptoms today.  She denies fever/chills/nausea/vomiting/chest pain/shortness of breath.  Course of ED stay:   Patient was given instructions for sciatica and received Decadron/Toradol in the emergency department as well as prescriptions for gabapentin/ibuprofen.  She was advised to follow-up with her primary care physician within the next week for re-evaluation/return to the emergency department if condition worsens.                                      Clinical Impression:  Final diagnoses:  [M54.32] Sciatica of left side (Primary)          ED Disposition Condition    Discharge Stable          ED Prescriptions       Medication Sig Dispense Start Date End Date Auth. Provider    ibuprofen (ADVIL,MOTRIN) 800 MG tablet Take 1 tablet (800 mg total) by mouth every 8 (eight) hours as needed for Pain. 30 tablet 2/10/2024 -- Jhony Reed MD    gabapentin (NEURONTIN) 300 MG capsule Take 1 capsule (300 mg total) by mouth every evening. 30 capsule 2/10/2024 3/11/2024  Jhony Reed MD          Follow-up Information    None          Jhony Reed MD  02/10/24 0047

## 2024-04-28 ENCOUNTER — HOSPITAL ENCOUNTER (EMERGENCY)
Facility: HOSPITAL | Age: 39
Discharge: HOME | End: 2024-04-28
Attending: EMERGENCY MEDICINE
Payer: MEDICAID

## 2024-04-28 VITALS
OXYGEN SATURATION: 97 % | TEMPERATURE: 98 F | BODY MASS INDEX: 48.82 KG/M2 | RESPIRATION RATE: 18 BRPM | WEIGHT: 293 LBS | HEIGHT: 65 IN | SYSTOLIC BLOOD PRESSURE: 144 MMHG | HEART RATE: 84 BPM | DIASTOLIC BLOOD PRESSURE: 100 MMHG

## 2024-04-28 DIAGNOSIS — G57.11 MERALGIA PARESTHETICA OF RIGHT SIDE: ICD-10-CM

## 2024-04-28 DIAGNOSIS — M17.12 OSTEOARTHRITIS OF LEFT KNEE, UNSPECIFIED OSTEOARTHRITIS TYPE: Primary | ICD-10-CM

## 2024-04-28 DIAGNOSIS — M54.32 SCIATICA OF LEFT SIDE: ICD-10-CM

## 2024-04-28 PROCEDURE — 63600175 PHARM REV CODE 636 W HCPCS: Mod: ER | Performed by: EMERGENCY MEDICINE

## 2024-04-28 PROCEDURE — 25000003 PHARM REV CODE 250: Mod: ER | Performed by: EMERGENCY MEDICINE

## 2024-04-28 PROCEDURE — 99284 EMERGENCY DEPT VISIT MOD MDM: CPT | Mod: 25,ER

## 2024-04-28 PROCEDURE — 96372 THER/PROPH/DIAG INJ SC/IM: CPT | Performed by: EMERGENCY MEDICINE

## 2024-04-28 RX ORDER — HYDROCODONE BITARTRATE AND ACETAMINOPHEN 5; 325 MG/1; MG/1
1 TABLET ORAL EVERY 8 HOURS PRN
Qty: 20 TABLET | Refills: 0 | Status: SHIPPED | OUTPATIENT
Start: 2024-04-28 | End: 2024-05-08

## 2024-04-28 RX ORDER — IBUPROFEN 800 MG/1
800 TABLET ORAL EVERY 8 HOURS PRN
Qty: 30 TABLET | Refills: 0 | Status: SHIPPED | OUTPATIENT
Start: 2024-04-28

## 2024-04-28 RX ORDER — HYDROCODONE BITARTRATE AND ACETAMINOPHEN 5; 325 MG/1; MG/1
1 TABLET ORAL
Status: COMPLETED | OUTPATIENT
Start: 2024-04-28 | End: 2024-04-28

## 2024-04-28 RX ORDER — DEXAMETHASONE SODIUM PHOSPHATE 4 MG/ML
12 INJECTION, SOLUTION INTRA-ARTICULAR; INTRALESIONAL; INTRAMUSCULAR; INTRAVENOUS; SOFT TISSUE
Status: COMPLETED | OUTPATIENT
Start: 2024-04-28 | End: 2024-04-28

## 2024-04-28 RX ORDER — KETOROLAC TROMETHAMINE 30 MG/ML
30 INJECTION, SOLUTION INTRAMUSCULAR; INTRAVENOUS
Status: COMPLETED | OUTPATIENT
Start: 2024-04-28 | End: 2024-04-28

## 2024-04-28 RX ADMIN — DEXAMETHASONE SODIUM PHOSPHATE 12 MG: 4 INJECTION INTRA-ARTICULAR; INTRALESIONAL; INTRAMUSCULAR; INTRAVENOUS; SOFT TISSUE at 01:04

## 2024-04-28 RX ADMIN — HYDROCODONE BITARTRATE AND ACETAMINOPHEN 1 TABLET: 5; 325 TABLET ORAL at 01:04

## 2024-04-28 RX ADMIN — KETOROLAC TROMETHAMINE 30 MG: 30 INJECTION, SOLUTION INTRAMUSCULAR at 01:04

## 2024-04-28 NOTE — ED PROVIDER NOTES
Encounter Date: 4/27/2024    SCRIBE #1 NOTE: I, LUISANA Bautista, am scribing for, and in the presence of,  Rambo Shafer MD. I have scribed the following portions of the note - Other sections scribed: HPI, ROS, PE.       History     Chief Complaint   Patient presents with    Knee Pain    Leg Pain     PT C/O LEFT KNEE AND LEFT  LOWER LEG PAIN FOR APPROX 3 DAYS, DENIES INJURY     Anamaria Connell is a 38 y.o. female, with a PMHx of HTN, arthritis, and sciatica, who presents to the ED with left leg pain which started earlier today. Patient reports she felt a sharp pain in her left knee when trying to get up from the couch. She says it felt like a sore,pulling sensation. She denies any recent falls, injuries, or trauma to the area. She is supposed to see an orthopedic specialist, but has not been able to schedule an appointment due to insurance complications. She attempted treatment with gabapentin to no relief.   She also complains of chronic lower back pain and right leg numbness. Numbness radiates from lateral thigh down to ankle. She has seen a neurologist for vision problems, but has not been able to follow up regarding leg numbness due to insurance complications. No other exacerbating or alleviating factors. Denies any other associated symptoms.    The history is provided by the patient. No  was used.     Review of patient's allergies indicates:  No Known Allergies  Past Medical History:   Diagnosis Date    Arthritis     Hypertension     Sciatic nerve pain      Past Surgical History:   Procedure Laterality Date    HERNIA REPAIR      TUBAL LIGATION       No family history on file.  Social History     Tobacco Use    Smoking status: Never    Smokeless tobacco: Never   Substance Use Topics    Alcohol use: Yes     Comment: ocassionally    Drug use: No     Review of Systems   Constitutional:  Negative for fever.   HENT:  Negative for sore throat.    Eyes:  Negative for visual disturbance.    Respiratory:  Negative for shortness of breath.    Cardiovascular:  Negative for chest pain.   Gastrointestinal:  Negative for abdominal pain.   Genitourinary:  Negative for difficulty urinating.   Musculoskeletal:  Positive for back pain and myalgias.   Skin:  Negative for rash.   Neurological:  Positive for numbness. Negative for headaches.       Physical Exam     Initial Vitals [04/28/24 0004]   BP Pulse Resp Temp SpO2   131/85 95 20 98.1 °F (36.7 °C) 97 %      MAP       --         Physical Exam    Nursing note and vitals reviewed.  Constitutional: She appears well-developed and well-nourished.   Eyes: EOM are normal. Pupils are equal, round, and reactive to light.   Neck: Neck supple. No thyromegaly present. No JVD present.   Normal range of motion.  Cardiovascular:  Normal rate, regular rhythm, normal heart sounds and intact distal pulses.     Exam reveals no gallop and no friction rub.       No murmur heard.  Pulmonary/Chest: Breath sounds normal. No respiratory distress.   Abdominal: Abdomen is soft. Bowel sounds are normal.   Musculoskeletal:         General: No tenderness or edema. Normal range of motion.      Cervical back: Normal range of motion and neck supple.      Comments: No swelling, bruising, or tenderness to bilateral lower extremities. Full ROM without complication.     Neurological: She is alert and oriented to person, place, and time. She has normal strength.   Skin: Skin is warm and dry.         ED Course   Procedures  Labs Reviewed - No data to display       Imaging Results    None          Medications   ketorolac injection 30 mg (30 mg Intramuscular Given 4/28/24 0112)   dexAMETHasone injection 12 mg (12 mg Intramuscular Given 4/28/24 0110)   HYDROcodone-acetaminophen 5-325 mg per tablet 1 tablet (1 tablet Oral Given 4/28/24 0113)     Medical Decision Making  This is an emergent evaluation of a 38 y.o. female who presents with left leg pain and right leg numbness. The patient was seen and  examined. The history and physical exam was obtained. No abnormalities noted during physical exam. The nursing notes and vital signs were reviewed. Secondary to symptoms and examination findings, will treat with hydrocodone-acetaminophen, dexamethasone injection, and ketorolac injection.    Differential diagnosis include but are not limited to:   Chronic leg pain, sciatica, arthritis.       Risk  Prescription drug management.    No evidence of cauda equina.  Symptoms appear to be chronic.  No acute joint inflammation or evidence of septic arthritis or inflammatory arthritis.  Normal neurovascular exam.  Will treat symptomatically.  Patient is a follow up with her orthopedic doctor.                                I, Rambo Shafer, personally performed the services described in this documentation.  All medical record entries made by the scribe were at my direction and in my presence.  I have reviewed the chart and agree that the record reflects my personal performance and is accurate and complete.    Clinical Impression:  Final diagnoses:  [M17.12] Osteoarthritis of left knee, unspecified osteoarthritis type (Primary)  [M54.32] Sciatica of left side  [G57.11] Meralgia paresthetica of right side          ED Disposition Condition    Discharge Stable          ED Prescriptions       Medication Sig Dispense Start Date End Date Auth. Provider    ibuprofen (ADVIL,MOTRIN) 800 MG tablet Take 1 tablet (800 mg total) by mouth every 8 (eight) hours as needed for Pain. 30 tablet 4/28/2024 -- Rambo Shafer MD    HYDROcodone-acetaminophen (NORCO) 5-325 mg per tablet Take 1 tablet by mouth every 8 (eight) hours as needed (breakthrough pain). 20 tablet 4/28/2024 5/8/2024 Rambo Shafer MD          Follow-up Information       Follow up With Specialties Details Why Contact Info    [facility]    [address] [facility] [address]  [phone]          follow up with your orthopedic and keep up  with your bariatric surgery plans.             Rambo Shafer MD  04/28/24 0137

## 2024-04-28 NOTE — ED TRIAGE NOTES
"Anamaria Connell, a 38 y.o. female presents to the ED w/ complaint of left knee pain for past 3 days, has a h/o arthritis and has been told that her knee is "bone on bone" need to see Orthopedist.     Triage note:  Chief Complaint   Patient presents with    Knee Pain    Leg Pain     PT C/O LEFT KNEE AND LEFT  LOWER LEG PAIN FOR APPROX 3 DAYS, DENIES INJURY     Review of patient's allergies indicates:  No Known Allergies  Past Medical History:   Diagnosis Date    Arthritis     Hypertension     Sciatic nerve pain       "

## 2024-04-28 NOTE — Clinical Note
"Anamaria Mchugh" Becki was seen and treated in our emergency department on 4/27/2024.  She may return to work on 04/29/2024.       If you have any questions or concerns, please don't hesitate to call.      Clarita Nava RN    "

## 2024-07-17 ENCOUNTER — HOSPITAL ENCOUNTER (EMERGENCY)
Facility: HOSPITAL | Age: 39
Discharge: HOME | End: 2024-07-17
Attending: STUDENT IN AN ORGANIZED HEALTH CARE EDUCATION/TRAINING PROGRAM
Payer: MEDICAID

## 2024-07-17 VITALS
HEART RATE: 95 BPM | TEMPERATURE: 98 F | DIASTOLIC BLOOD PRESSURE: 96 MMHG | BODY MASS INDEX: 47.09 KG/M2 | WEIGHT: 293 LBS | OXYGEN SATURATION: 97 % | SYSTOLIC BLOOD PRESSURE: 150 MMHG | RESPIRATION RATE: 20 BRPM | HEIGHT: 66 IN

## 2024-07-17 DIAGNOSIS — M19.90 ARTHRITIS: Primary | ICD-10-CM

## 2024-07-17 DIAGNOSIS — M25.561 RIGHT KNEE PAIN: ICD-10-CM

## 2024-07-17 PROCEDURE — 63600175 PHARM REV CODE 636 W HCPCS: Mod: ER

## 2024-07-17 PROCEDURE — 96372 THER/PROPH/DIAG INJ SC/IM: CPT

## 2024-07-17 PROCEDURE — 99284 EMERGENCY DEPT VISIT MOD MDM: CPT | Mod: 25,ER

## 2024-07-17 RX ORDER — DEXAMETHASONE SODIUM PHOSPHATE 4 MG/ML
4 INJECTION, SOLUTION INTRA-ARTICULAR; INTRALESIONAL; INTRAMUSCULAR; INTRAVENOUS; SOFT TISSUE
Status: COMPLETED | OUTPATIENT
Start: 2024-07-17 | End: 2024-07-17

## 2024-07-17 RX ORDER — KETOROLAC TROMETHAMINE 30 MG/ML
15 INJECTION, SOLUTION INTRAMUSCULAR; INTRAVENOUS
Status: COMPLETED | OUTPATIENT
Start: 2024-07-17 | End: 2024-07-17

## 2024-07-17 RX ORDER — MELOXICAM 15 MG/1
15 TABLET ORAL DAILY
Qty: 14 TABLET | Refills: 0 | Status: SHIPPED | OUTPATIENT
Start: 2024-07-17

## 2024-07-17 RX ADMIN — DEXAMETHASONE SODIUM PHOSPHATE 4 MG: 4 INJECTION INTRA-ARTICULAR; INTRALESIONAL; INTRAMUSCULAR; INTRAVENOUS; SOFT TISSUE at 09:07

## 2024-07-17 RX ADMIN — KETOROLAC TROMETHAMINE 15 MG: 30 INJECTION, SOLUTION INTRAMUSCULAR at 09:07

## 2024-07-17 NOTE — Clinical Note
"Anamaria "Malcolm Connell was seen and treated in our emergency department on 7/17/2024.  She may return to work on 07/20/2024.       If you have any questions or concerns, please don't hesitate to call.      De Hale PA-C"

## 2024-07-18 NOTE — ED PROVIDER NOTES
Encounter Date: 7/17/2024       History     Chief Complaint   Patient presents with    Knee Pain     Pt reports right knee pain since yesterday, denies injury     Patient is a 39-year-old female with a past medical history of hypertension, arthritis who presents to the emergency department for evaluation of acute on chronic worsening right knee pain over the last day.  She denies trauma or injury.  Denies radiation of the pain.  Reports some swelling, denies redness.  Denies numbness or tingling.  Denies fever, vomiting.  Denies history of diabetes.  Reports she has an upcoming appointment with Orthopedics.  No history of blood clots.  No history of recent surgeries.  No other complaints today.    The history is provided by the patient.     Review of patient's allergies indicates:  No Known Allergies  Past Medical History:   Diagnosis Date    Arthritis     Hypertension     Sciatic nerve pain      Past Surgical History:   Procedure Laterality Date    HERNIA REPAIR      TUBAL LIGATION       No family history on file.  Social History     Tobacco Use    Smoking status: Never    Smokeless tobacco: Never   Substance Use Topics    Alcohol use: Yes     Comment: ocassionally    Drug use: No     Review of Systems   Constitutional:  Negative for chills and fever.   Respiratory:  Negative for shortness of breath.    Cardiovascular:  Negative for chest pain.   Gastrointestinal:  Negative for abdominal pain, nausea and vomiting.   Musculoskeletal:  Positive for arthralgias and joint swelling.   Skin:  Negative for color change.   Neurological:  Negative for dizziness, light-headedness, numbness and headaches.       Physical Exam     Initial Vitals [07/17/24 2045]   BP Pulse Resp Temp SpO2   (!) 150/96 95 20 98.1 °F (36.7 °C) 97 %      MAP       --         Physical Exam    Nursing note and vitals reviewed.  Constitutional: She appears well-developed and well-nourished.   HENT:   Head: Normocephalic and atraumatic.   Right Ear:  External ear normal.   Left Ear: External ear normal.   Neck: Carotid bruit is not present.   Normal range of motion.  Cardiovascular:  Normal rate, regular rhythm, normal heart sounds and intact distal pulses.     Exam reveals no gallop and no friction rub.       No murmur heard.  Pulmonary/Chest: Breath sounds normal. No respiratory distress. She has no wheezes. She has no rhonchi. She has no rales.   Abdominal: Abdomen is soft. Bowel sounds are normal. She exhibits no distension. There is no abdominal tenderness. There is no rebound and no guarding.   Musculoskeletal:         General: Normal range of motion.      Cervical back: Normal range of motion.      Comments: Patient is obese.  There is normal passive flexion and extension of the right knee.  Patient is in wheelchair.  2+ DP pulse.  Neurovascularly intact.     Neurological: She is alert and oriented to person, place, and time. GCS score is 15. GCS eye subscore is 4. GCS verbal subscore is 5. GCS motor subscore is 6.   Psychiatric: She has a normal mood and affect.         ED Course   Procedures  Labs Reviewed - No data to display       Imaging Results              X-Ray Knee 3 View Right (Final result)  Result time 07/17/24 21:29:50      Final result by Renato Little MD (07/17/24 21:29:50)                   Impression:      No acute fracture.    Degenerative changes, as above, mildly compress compared to prior.      Electronically signed by: Renato Little MD  Date:    07/17/2024  Time:    21:29               Narrative:    EXAMINATION:  XR KNEE 3 VIEW RIGHT    CLINICAL HISTORY:  Pain in right knee    TECHNIQUE:  AP, lateral, and Merchant views of the right knee were performed.    COMPARISON:  03/12/2015.    FINDINGS:  No fracture or dislocation.  No large joint effusion.  Moderate to advanced tricompartment degenerative change most pronounced in the patellofemoral compartment, mildly progressed compared to prior.                                        Medications   dexAMETHasone injection 4 mg (4 mg Intramuscular Given 7/17/24 2123)   ketorolac injection 15 mg (15 mg Intramuscular Given 7/17/24 2123)     Medical Decision Making  This is an emergent evaluation of a 39-year-old female with a past medical history of hypertension, arthritis who presents to the emergency department for evaluation of acute on chronic worsening right knee pain over the last day.    Patient looks well clinically. Patient is obese.  There is normal passive flexion and extension of the right knee.  Patient is in wheelchair.  2+ DP pulse.  Neurovascularly intact. Regular rate rhythm without murmurs.  No carotid bruits appreciated on exam. Lungs are clear to auscultation bilaterally.  Abdomen is soft, nontender, non distended, with normal bowel sounds.     Differential diagnosis includes but is not limited to fracture, dislocation, sprain, arthritis, tendinitis.  Considered but doubt DVT.    Workup initiated with x-ray.  Normal A1c and renal function in chart, ordered Toradol, Decadron.  Vital signs, chart, labs, and/or imaging were all reviewed.  See ED course below and interpretations above. My overall impression is arthritis. Will discharge home with meloxicam with orthopedic follow-up.  Ambulatory referral placed. Patient is very well appearing, and in no acute distress. Vital signs are reassuring here in the emergency department, patient is afebrile, breathing comfortable, satting 97 % on room air. Patient/Caregiver is stable for discharge at this time.  Patient/Caregiver was informed of results and plan of care. Patient/Caregiver verbalized understanding of care plan. All questions and concerns were addressed. Discussed strict return precautions with the patient/caregiver. Instructed follow up with primary care provider within 1 week.      De Hale PA-C    DISCLAIMER: This note was prepared with [employer] voice recognition transcription software. Garbled syntax, mangled pronouns,  and other bizarre constructions may be attributed to that software system.       Amount and/or Complexity of Data Reviewed  Radiology: ordered. Decision-making details documented in ED Course.    Risk  Prescription drug management.               ED Course as of 07/17/24 2245 Wed Jul 17, 2024 2133 X-Ray Knee 3 View Right  No fracture or dislocation.  No large joint effusion.  Moderate to advanced tricompartment degenerative change most pronounced in the patellofemoral compartment, mildly progressed compared to prior. [TM]      ED Course User Index  [TM] De Hale PA-C                           Clinical Impression:  Final diagnoses:  [M25.561] Right knee pain  [M19.90] Arthritis (Primary)          ED Disposition Condition    Discharge Stable          ED Prescriptions       Medication Sig Dispense Start Date End Date Auth. Provider    meloxicam (MOBIC) 15 MG tablet Take 1 tablet (15 mg total) by mouth once daily. 14 tablet 7/17/2024 -- De Hale PA-C          Follow-up Information       Follow up With Specialties Details Why Contact Info    Primary care doctor  Schedule an appointment as soon as possible for a visit in 3 days      [facility] ED Emergency Medicine Go to  As needed, If symptoms worsen, or new symptoms develop [address] [facility] [address] [phone]             De Hale PA-C  07/17/24 2245

## 2024-07-18 NOTE — DISCHARGE INSTRUCTIONS
You were seen in the emergency department today for knee pain from arthritis.  Please take all medications as prescribed and as we discussed.  Follow-up with specialist if instructed to do so.  It is important to remember that some problems are difficult to diagnose and may not be found during your Emergency Department visit. Be sure to follow up with your primary care doctor and review all labs/imaging/tests that were performed during this visit with them. Some labs/tests may be outside of the normal range and require non-emergent follow-up and further investigation to help diagnose/exclude/prevent complications or other medical conditions. Return to the emergency department for any new or worsening symptoms. Thank you for allowing me to care for you today, it was my pleasure. I hope you get to feeling better soon!

## 2024-11-22 ENCOUNTER — HOSPITAL ENCOUNTER (EMERGENCY)
Facility: HOSPITAL | Age: 39
Discharge: HOME | End: 2024-11-22
Attending: INTERNAL MEDICINE
Payer: MEDICAID

## 2024-11-22 VITALS
WEIGHT: 293 LBS | DIASTOLIC BLOOD PRESSURE: 86 MMHG | RESPIRATION RATE: 20 BRPM | SYSTOLIC BLOOD PRESSURE: 127 MMHG | TEMPERATURE: 98 F | OXYGEN SATURATION: 95 % | BODY MASS INDEX: 47.09 KG/M2 | HEART RATE: 80 BPM | HEIGHT: 66 IN

## 2024-11-22 DIAGNOSIS — J06.9 VIRAL URI WITH COUGH: Primary | ICD-10-CM

## 2024-11-22 LAB
INFLUENZA A ANTIGEN, POC: NEGATIVE
INFLUENZA B ANTIGEN, POC: NEGATIVE

## 2024-11-22 PROCEDURE — 87804 INFLUENZA ASSAY W/OPTIC: CPT | Mod: 59,ER

## 2024-11-22 PROCEDURE — 99283 EMERGENCY DEPT VISIT LOW MDM: CPT | Mod: ER

## 2024-11-22 RX ORDER — AZELASTINE 1 MG/ML
2 SPRAY, METERED NASAL 2 TIMES DAILY
Qty: 30 ML | Refills: 0 | Status: SHIPPED | OUTPATIENT
Start: 2024-11-22 | End: 2025-01-16

## 2024-11-22 RX ORDER — FLUTICASONE PROPIONATE 50 MCG
2 SPRAY, SUSPENSION (ML) NASAL DAILY
Qty: 15 G | Refills: 0 | Status: SHIPPED | OUTPATIENT
Start: 2024-11-22

## 2024-11-22 NOTE — Clinical Note
"Anamaria Oliverica" Becki was seen and treated in our emergency department on 11/22/2024.  She may return to work on 11/25/2024.       If you have any questions or concerns, please don't hesitate to call.      Silvano Valdez RN    "

## 2024-11-22 NOTE — ED PROVIDER NOTES
Encounter Date: 11/22/2024       History     Chief Complaint   Patient presents with    Cough     Pt c/o cough which has been getting progressively worse over the past few days     39-year-old female presents to the emergency department complaining of cough and congestion for the past few, progressively worsening.  Denies fever/chills/nausea/vomiting/chest pain/shortness of breath.    The history is provided by the patient. No  was used.     Review of patient's allergies indicates:  No Known Allergies  Past Medical History:   Diagnosis Date    Arthritis     Hypertension     Sciatic nerve pain      Past Surgical History:   Procedure Laterality Date    HERNIA REPAIR      HYSTERECTOMY      TUBAL LIGATION       No family history on file.  Social History     Tobacco Use    Smoking status: Never    Smokeless tobacco: Never   Substance Use Topics    Alcohol use: Yes     Comment: ocassionally    Drug use: No     Review of Systems   Constitutional:  Negative for chills and fever.   HENT:  Positive for congestion.    Respiratory:  Positive for cough.    Cardiovascular:  Negative for chest pain.   Gastrointestinal:  Negative for nausea and vomiting.   All other systems reviewed and are negative.      Physical Exam     Initial Vitals [11/22/24 0329]   BP Pulse Resp Temp SpO2   (!) 156/99 87 20 97.7 °F (36.5 °C) 96 %      MAP       --         Physical Exam    Nursing note and vitals reviewed.  Constitutional: She is not diaphoretic. No distress.   HENT:   Head: Normocephalic and atraumatic.   Right Ear: External ear normal.   Left Ear: External ear normal.   Posterior oropharyngeal erythema without exudate or edema, clear postnasal drip   Eyes: Conjunctivae are normal.   Neck:   Normal range of motion.  Cardiovascular:  Normal rate and regular rhythm.           Pulmonary/Chest: Breath sounds normal. No respiratory distress. She has no wheezes. She has no rhonchi. She has no rales.   Abdominal: Abdomen is  soft. Bowel sounds are normal.   Musculoskeletal:      Cervical back: Normal range of motion.     Neurological: She is alert. She has normal strength.   Skin: Skin is warm.         ED Course   Procedures  Labs Reviewed   POCT INFLUENZA A/B MOLECULAR   POCT RAPID INFLUENZA A/B       Result Value    Influenza B Ag negative      Inflenza A Ag negative            Imaging Results    None          Medications - No data to display  Medical Decision Making  39-year-old female presents to the emergency department complaining of cough and congestion for the past few, progressively worsening.  Denies fever/chills/nausea/vomiting/chest pain/shortness of breath.  Course of ED stay:   Rapid flu was negative.  Patient was given instructions for viral URI and prescriptions for Astelin/fluticasone.  She was advised to follow-up with her primary care physician within the next week for re-evaluation/return to the emergency department if condition worsens.    Amount and/or Complexity of Data Reviewed  Labs: ordered.    Risk  Prescription drug management.                                      Clinical Impression:  Final diagnoses:  [J06.9] Viral URI with cough (Primary)          ED Disposition Condition    Discharge Stable          ED Prescriptions       Medication Sig Dispense Start Date End Date Auth. Provider    azelastine (ASTELIN) 137 mcg (0.1 %) nasal spray 2 sprays (274 mcg total) by Nasal route 2 (two) times daily. 30 mL 11/22/2024 1/16/2025 Jhony Reed MD    fluticasone propionate (FLONASE) 50 mcg/actuation nasal spray 2 sprays (100 mcg total) by Each Nostril route once daily. 15 g 11/22/2024 -- Jhony Reed MD          Follow-up Information       Follow up With Specialties Details Why Contact Info    [facility]  Schedule an appointment as soon as possible for a visit in 1 week For reevaluation [address] [facility] [address]  [phone]               Jhony Reed MD  11/22/24  [id number]

## 2025-04-05 ENCOUNTER — HOSPITAL ENCOUNTER (EMERGENCY)
Facility: HOSPITAL | Age: 40
Discharge: HOME | End: 2025-04-05
Attending: STUDENT IN AN ORGANIZED HEALTH CARE EDUCATION/TRAINING PROGRAM
Payer: COMMERCIAL

## 2025-04-05 VITALS
DIASTOLIC BLOOD PRESSURE: 84 MMHG | RESPIRATION RATE: 20 BRPM | WEIGHT: 293 LBS | TEMPERATURE: 98 F | OXYGEN SATURATION: 100 % | SYSTOLIC BLOOD PRESSURE: 139 MMHG | BODY MASS INDEX: 56.49 KG/M2 | HEART RATE: 87 BPM

## 2025-04-05 DIAGNOSIS — M25.561 PAIN IN BOTH KNEES, UNSPECIFIED CHRONICITY: ICD-10-CM

## 2025-04-05 DIAGNOSIS — M54.40 LOW BACK PAIN WITH SCIATICA, SCIATICA LATERALITY UNSPECIFIED, UNSPECIFIED BACK PAIN LATERALITY, UNSPECIFIED CHRONICITY: Primary | ICD-10-CM

## 2025-04-05 DIAGNOSIS — M25.562 PAIN IN BOTH KNEES, UNSPECIFIED CHRONICITY: ICD-10-CM

## 2025-04-05 PROCEDURE — 63600175 PHARM REV CODE 636 W HCPCS

## 2025-04-05 PROCEDURE — 99284 EMERGENCY DEPT VISIT MOD MDM: CPT | Mod: 25

## 2025-04-05 PROCEDURE — 96372 THER/PROPH/DIAG INJ SC/IM: CPT

## 2025-04-05 RX ORDER — LIDOCAINE 50 MG/G
1 PATCH TOPICAL DAILY
Qty: 14 PATCH | Refills: 0 | Status: SHIPPED | OUTPATIENT
Start: 2025-04-05

## 2025-04-05 RX ORDER — MORPHINE SULFATE 4 MG/ML
6 INJECTION, SOLUTION INTRAMUSCULAR; INTRAVENOUS
Status: COMPLETED | OUTPATIENT
Start: 2025-04-05 | End: 2025-04-05

## 2025-04-05 RX ORDER — ACETAMINOPHEN 500 MG
500 TABLET ORAL EVERY 6 HOURS PRN
Qty: 30 TABLET | Refills: 0 | Status: SHIPPED | OUTPATIENT
Start: 2025-04-05

## 2025-04-05 RX ORDER — KETOROLAC TROMETHAMINE 30 MG/ML
30 INJECTION, SOLUTION INTRAMUSCULAR; INTRAVENOUS
Status: COMPLETED | OUTPATIENT
Start: 2025-04-05 | End: 2025-04-05

## 2025-04-05 RX ORDER — IBUPROFEN 800 MG/1
800 TABLET ORAL EVERY 6 HOURS PRN
Qty: 20 TABLET | Refills: 0 | Status: SHIPPED | OUTPATIENT
Start: 2025-04-05

## 2025-04-05 RX ADMIN — KETOROLAC TROMETHAMINE 30 MG: 30 INJECTION, SOLUTION INTRAMUSCULAR; INTRAVENOUS at 05:04

## 2025-04-05 RX ADMIN — MORPHINE SULFATE 6 MG: 4 INJECTION INTRAVENOUS at 06:04

## 2025-04-05 NOTE — ED TRIAGE NOTES
Pt reports chronic back pain, no relief with prescribed medications.  Hx: HTN, arthritis, overweight

## 2025-04-05 NOTE — DISCHARGE INSTRUCTIONS
Take Tylenol and ibuprofen as directed for pain.    Thank you for coming to our Emergency Department today. It is important to remember that some problems or medical conditions are difficult to diagnose and may not be found or addressed during your Emergency Department visit.  These conditions often start with non-specific symptoms and can only be diagnosed on follow up visits with your primary care physician or specialist when the symptoms continue or change. Please remember that all medical conditions can change, and we cannot predict how you will be feeling tomorrow or the next day. Return to the ER with any questions/concerns, new/concerning symptoms, worsening or failure to improve.       Be sure to follow up with your primary care doctor and review all labs/imaging/tests that were performed during your ER visit with them. It is very common for us to identify non-emergent incidental findings which must be followed up with your primary care physician.  Some labs/imaging/tests may be outside of the normal range, and require non-emergent follow-up and/or further investigation/treatment/procedures/testing to help diagnose/exclude/prevent complications or other potentially serious medical conditions. Some abnormalities may not have been discussed or addressed during your ER visit.     An ER visit does not replace a primary care visit, and many screening tests or follow-up tests cannot be ordered by an ER doctor or performed by the ER. Some tests may even require pre-approval.    If you do not have a primary care doctor, you may contact the one listed on your discharge paperwork or you may also call the [facility] Appointment Desk at [phone] , or [address] [place] [address] at  [phone] to schedule an appointment, or establish care with a primary care doctor or even a specialist and to obtain information about local resources. It is important to your health that you have a primary care doctor.    Please take all  medications as directed. We have done our best to select a medication for you that will treat your condition however, all medications may potentially have side-effects and it is impossible to predict which medications may give you side-effects or what those side-effects (if any) those medications may give you.  If you feel that you are having a negative effect or side-effect of any medication you should stop taking those medications immediately and seek medical attention. If you feel that you are having a life-threatening reaction call 911.        Do not drive, swim, climb to height, take a bath, operate heavy machinery, drink alcohol or take potentially sedating medications, sign any legal documents or make any important decisions for 24 hours if you have received any pain medications, sedatives or mood altering drugs during your ER visit or within 24 hours of taking them if they have been prescribed to you.     You can find additional resources for Dentists, hearing aids, durable medical equipment, low cost pharmacies and other resources at [email or web address]

## 2025-04-05 NOTE — ED PROVIDER NOTES
"Encounter Date: 4/5/2025       History     Chief Complaint   Patient presents with    Generalized Body Aches     Pt complaining of generalized body aches to back and knees x 1 mo unrelieved by flexeril     Patient is a 39-year-old female past medical history of hypertension, arthritis, sciatica is presenting for evaluation of back and knee pain.  Patient states that she has been having back and knee pain for "awhile now", sitting over the past month it has gotten worse.  States it back pain shifts from the right to left side and causes her sciatica to flare down her legs.  Denies recent injuries or fall.  Reports she has been taking Flexeril little relief.  Patient denies leg weakness, saddle anesthesia, or urinary or bowel incontinence.  Denies hematochezia or pain with urination.  Patient reports that she has spoken with her orthopedics who recommends bilateral knee replacements, that she is currently considering.        Review of patient's allergies indicates:  No Known Allergies  Past Medical History:   Diagnosis Date    Arthritis     Hypertension     Sciatic nerve pain      Past Surgical History:   Procedure Laterality Date    HERNIA REPAIR      HYSTERECTOMY      TUBAL LIGATION       No family history on file.  Social History[1]  Review of Systems   Musculoskeletal:  Positive for arthralgias and back pain.   Neurological:  Negative for weakness.       Physical Exam     Initial Vitals [04/05/25 1631]   BP Pulse Resp Temp SpO2   (!) 173/99 91 18 98.1 °F (36.7 °C) 97 %      MAP       --         Physical Exam    Constitutional: She appears well-developed and well-nourished. She is not diaphoretic. No distress.   HENT:   Head: Normocephalic and atraumatic.   Right Ear: External ear normal.   Left Ear: External ear normal.   Eyes: Conjunctivae are normal. Right eye exhibits no discharge. Left eye exhibits no discharge.   Neck:   Normal range of motion.  Pulmonary/Chest: No respiratory distress.   Musculoskeletal:    "      General: Normal range of motion.      Cervical back: Normal range of motion. No tenderness or bony tenderness.      Thoracic back: No tenderness or bony tenderness.      Lumbar back: No tenderness or bony tenderness.      Right knee: Normal. No swelling or bony tenderness. Normal range of motion.      Left knee: Normal. No swelling or bony tenderness. Normal range of motion.     Neurological: She is alert and oriented to person, place, and time.   Skin: Skin is warm and dry. Capillary refill takes less than 2 seconds.   Psychiatric: She has a normal mood and affect.         ED Course   Procedures  Labs Reviewed - No data to display       Imaging Results              X-Ray Lumbar Spine Ap And Lateral (Final result)  Result time 04/05/25 17:15:02      Final result by Rebel Buenrostro MD (04/05/25 17:15:02)                   Impression:      No acute lumbar spine abnormalities identified.      Electronically signed by: Rebel Buenrostro MD  Date:    04/05/2025  Time:    17:15               Narrative:    EXAMINATION:  XR LUMBAR SPINE AP AND LATERAL    CLINICAL HISTORY:  back pain;    TECHNIQUE:  AP, lateral and spot images were performed of the lumbar spine.    COMPARISON:  May 2022.    FINDINGS:  Lumbar spine alignment is within normal limits.  No evidence of acute lumbar spine fracture or subluxation.  Intervertebral disc spaces appear fairly well maintained.  Visualized sacrum is unremarkable.                                       Medications   ketorolac injection 30 mg (30 mg Intramuscular Given 4/5/25 1711)   morphine injection 6 mg (6 mg Intramuscular Given 4/5/25 1820)     Medical Decision Making  Patient is a 39-year-old female past medical history of hypertension, arthritis, sciatica is presenting for evaluation of back and knee pain.      Differential includes but not limited to arthritis, knee fracture/dislocation however less likely due to lack of recent injury, septic arthritis however less likely due  to lack of erythema swelling or pain with micro movements, gout, low back pain differential includes muscular strain, muscular spasm, sciatica, arthritis, cauda equina however less likely due to lack of leg weakness, saddle anesthesia, or urinary incontinence.    Patient is alert and afebrile.  Patient is nontoxic appearing, not in distress.  Vitals within normal limits.  On physical exam patient ambulating without difficulty.  No respiratory distress.  Normal range of motion of neck.  No midline spinal tenderness.  Normal flexion-extension of the bilateral knees without difficulty.  No erythema or swelling noted to the bilateral knees.  No tenderness present to the knees bilaterally.  No signs of fluid overload.  No leg edema.  Strong distal radial pulse bilaterally.    Patient is a x-rays in July of 2024 notable for degenerative changes.  Shared decision-making with patient decided against doing imaging of the knees at this time due to her bleeding that is worsening arthritis.  X-ray of lumbar spine negative for acute abnormalities such as fracture or dislocation.  Patient given Toradol and morphine for pain.  Discussed with patient's back pain may be due to muscular strain or worsening sciatica.  Discussed with patient need to take Tylenol or NSAIDs as directed for pain.  Discussed following up with her orthopedic provider for further evaluation and definitive management. Strict return precautions given for new or worsening symptoms.  Recommended follow up with PCP in 2 days.  Patient is in agreeance to this plan, and expresses understanding return precautions and follow up plan.  I thoroughly answered all patient's questions and concerns. Vitals are reassuring.  Patient is stable for discharge at this time.    Amount and/or Complexity of Data Reviewed  Radiology: ordered.    Risk  OTC drugs.  Prescription drug management.                                      Clinical Impression:  Final diagnoses:  [M54.40] Low  back pain with sciatica, sciatica laterality unspecified, unspecified back pain laterality, unspecified chronicity (Primary)  [M25.561, M25.562] Pain in both knees, unspecified chronicity          ED Disposition Condition    Discharge Stable          ED Prescriptions       Medication Sig Dispense Start Date End Date Auth. Provider    ibuprofen (ADVIL,MOTRIN) 800 MG tablet Take 1 tablet (800 mg total) by mouth every 6 (six) hours as needed for Pain. 20 tablet 4/5/2025 -- Alanis Pritchard PA-C    acetaminophen (TYLENOL) 500 MG tablet Take 1 tablet (500 mg total) by mouth every 6 (six) hours as needed for Pain. 30 tablet 4/5/2025 -- Alanis Pritchard PA-C    LIDOcaine (LIDODERM) 5 % Place 1 patch onto the skin once daily. Remove & Discard patch within 12 hours or as directed by MD 14 patch 4/5/2025 -- Alanis Pritchard PA-C          Follow-up Information       Follow up With Specialties Details Why Contact Info    [facility]  Schedule an appointment as soon as possible for a visit in 2 days For follow up [address] [facility] [address]  [phone]      [facility] Emergency Dept Emergency Medicine Go to  If new symptoms develop or symptoms worsen [address] [facility] [place] [address] [phone]                 [1]   Social History  Tobacco Use    Smoking status: Never    Smokeless tobacco: Never   Substance Use Topics    Alcohol use: Yes     Comment: ocassionally    Drug use: No        Alanis Pritchard PA-C  04/05/25 1920